# Patient Record
Sex: MALE | Race: WHITE | NOT HISPANIC OR LATINO | Employment: OTHER | URBAN - METROPOLITAN AREA
[De-identification: names, ages, dates, MRNs, and addresses within clinical notes are randomized per-mention and may not be internally consistent; named-entity substitution may affect disease eponyms.]

---

## 2017-01-06 ENCOUNTER — COMMUNICATION - HEALTHEAST (OUTPATIENT)
Dept: INTERNAL MEDICINE | Facility: CLINIC | Age: 67
End: 2017-01-06

## 2017-01-06 DIAGNOSIS — R52 PAIN: ICD-10-CM

## 2017-02-09 ENCOUNTER — COMMUNICATION - HEALTHEAST (OUTPATIENT)
Dept: INTERNAL MEDICINE | Facility: CLINIC | Age: 67
End: 2017-02-09

## 2017-02-09 DIAGNOSIS — R52 PAIN: ICD-10-CM

## 2017-03-12 ENCOUNTER — COMMUNICATION - HEALTHEAST (OUTPATIENT)
Dept: INTERNAL MEDICINE | Facility: CLINIC | Age: 67
End: 2017-03-12

## 2017-03-12 DIAGNOSIS — R52 PAIN: ICD-10-CM

## 2017-04-09 ENCOUNTER — COMMUNICATION - HEALTHEAST (OUTPATIENT)
Dept: INTERNAL MEDICINE | Facility: CLINIC | Age: 67
End: 2017-04-09

## 2017-04-14 ENCOUNTER — COMMUNICATION - HEALTHEAST (OUTPATIENT)
Dept: INTERNAL MEDICINE | Facility: CLINIC | Age: 67
End: 2017-04-14

## 2017-04-14 DIAGNOSIS — R52 PAIN: ICD-10-CM

## 2017-06-04 ENCOUNTER — COMMUNICATION - HEALTHEAST (OUTPATIENT)
Dept: INTERNAL MEDICINE | Facility: CLINIC | Age: 67
End: 2017-06-04

## 2017-06-05 ENCOUNTER — COMMUNICATION - HEALTHEAST (OUTPATIENT)
Dept: INTERNAL MEDICINE | Facility: CLINIC | Age: 67
End: 2017-06-05

## 2017-06-05 DIAGNOSIS — R52 PAIN: ICD-10-CM

## 2017-09-05 ENCOUNTER — COMMUNICATION - HEALTHEAST (OUTPATIENT)
Dept: INTERNAL MEDICINE | Facility: CLINIC | Age: 67
End: 2017-09-05

## 2017-09-28 ENCOUNTER — COMMUNICATION - HEALTHEAST (OUTPATIENT)
Dept: INTERNAL MEDICINE | Facility: CLINIC | Age: 67
End: 2017-09-28

## 2017-09-28 ENCOUNTER — OFFICE VISIT - HEALTHEAST (OUTPATIENT)
Dept: INTERNAL MEDICINE | Facility: CLINIC | Age: 67
End: 2017-09-28

## 2017-09-28 DIAGNOSIS — K13.0 LIP LESION: ICD-10-CM

## 2017-09-28 DIAGNOSIS — I10 ESSENTIAL HYPERTENSION WITH GOAL BLOOD PRESSURE LESS THAN 140/90: ICD-10-CM

## 2017-09-28 DIAGNOSIS — M54.50 LUMBAGO: ICD-10-CM

## 2017-09-28 DIAGNOSIS — Z23 NEED FOR VACCINATION: ICD-10-CM

## 2017-09-28 DIAGNOSIS — Z00.00 HEALTHCARE MAINTENANCE: ICD-10-CM

## 2017-09-28 DIAGNOSIS — M79.673 FOOT PAIN: ICD-10-CM

## 2017-09-28 DIAGNOSIS — K43.9 VENTRAL HERNIA: ICD-10-CM

## 2017-09-28 LAB
CHOLEST SERPL-MCNC: 185 MG/DL
FASTING STATUS PATIENT QL REPORTED: YES
HDLC SERPL-MCNC: 31 MG/DL
LDLC SERPL CALC-MCNC: 115 MG/DL
TRIGL SERPL-MCNC: 197 MG/DL

## 2017-09-28 ASSESSMENT — MIFFLIN-ST. JEOR: SCORE: 2041.79

## 2017-10-01 ENCOUNTER — COMMUNICATION - HEALTHEAST (OUTPATIENT)
Dept: INTERNAL MEDICINE | Facility: CLINIC | Age: 67
End: 2017-10-01

## 2017-10-01 DIAGNOSIS — I10 ESSENTIAL HYPERTENSION: ICD-10-CM

## 2017-10-02 ENCOUNTER — OFFICE VISIT - HEALTHEAST (OUTPATIENT)
Dept: SURGERY | Facility: CLINIC | Age: 67
End: 2017-10-02

## 2017-10-02 DIAGNOSIS — K43.6 INCARCERATED VENTRAL HERNIA: ICD-10-CM

## 2017-10-02 ASSESSMENT — MIFFLIN-ST. JEOR: SCORE: 2041.79

## 2017-10-03 ENCOUNTER — RECORDS - HEALTHEAST (OUTPATIENT)
Dept: ADMINISTRATIVE | Facility: OTHER | Age: 67
End: 2017-10-03

## 2017-10-30 ENCOUNTER — COMMUNICATION - HEALTHEAST (OUTPATIENT)
Dept: INTERNAL MEDICINE | Facility: CLINIC | Age: 67
End: 2017-10-30

## 2017-10-30 DIAGNOSIS — R52 PAIN: ICD-10-CM

## 2017-11-16 ENCOUNTER — OFFICE VISIT - HEALTHEAST (OUTPATIENT)
Dept: PODIATRY | Facility: CLINIC | Age: 67
End: 2017-11-16

## 2017-11-16 DIAGNOSIS — M21.6X2 PRONATION DEFORMITY OF BOTH FEET: ICD-10-CM

## 2017-11-16 DIAGNOSIS — M62.40 CONTRACTED, TENDON: ICD-10-CM

## 2017-11-16 DIAGNOSIS — M21.6X1 PRONATION DEFORMITY OF BOTH FEET: ICD-10-CM

## 2017-11-21 ENCOUNTER — COMMUNICATION - HEALTHEAST (OUTPATIENT)
Dept: OTHER | Facility: CLINIC | Age: 67
End: 2017-11-21

## 2017-12-01 ENCOUNTER — COMMUNICATION - HEALTHEAST (OUTPATIENT)
Dept: OTHER | Facility: CLINIC | Age: 67
End: 2017-12-01

## 2017-12-02 ENCOUNTER — COMMUNICATION - HEALTHEAST (OUTPATIENT)
Dept: INTERNAL MEDICINE | Facility: CLINIC | Age: 67
End: 2017-12-02

## 2017-12-02 DIAGNOSIS — R52 PAIN: ICD-10-CM

## 2017-12-30 ENCOUNTER — COMMUNICATION - HEALTHEAST (OUTPATIENT)
Dept: INTERNAL MEDICINE | Facility: CLINIC | Age: 67
End: 2017-12-30

## 2017-12-30 DIAGNOSIS — R52 PAIN: ICD-10-CM

## 2018-01-19 ENCOUNTER — RECORDS - HEALTHEAST (OUTPATIENT)
Dept: GENERAL RADIOLOGY | Facility: CLINIC | Age: 68
End: 2018-01-19

## 2018-01-19 ENCOUNTER — OFFICE VISIT - HEALTHEAST (OUTPATIENT)
Dept: INTERNAL MEDICINE | Facility: CLINIC | Age: 68
End: 2018-01-19

## 2018-01-19 DIAGNOSIS — M79.605 PAIN OF LEFT LOWER EXTREMITY: ICD-10-CM

## 2018-01-19 DIAGNOSIS — M79.89 LEG SWELLING: ICD-10-CM

## 2018-01-19 DIAGNOSIS — I10 ESSENTIAL HYPERTENSION: ICD-10-CM

## 2018-01-19 DIAGNOSIS — T14.8XXA BRUISE: ICD-10-CM

## 2018-01-19 DIAGNOSIS — M79.89 OTHER SPECIFIED SOFT TISSUE DISORDERS: ICD-10-CM

## 2018-01-19 DIAGNOSIS — M79.605 PAIN IN LEFT LEG: ICD-10-CM

## 2018-01-19 DIAGNOSIS — T14.8XXA OTHER INJURY OF UNSPECIFIED BODY REGION, INITIAL ENCOUNTER: ICD-10-CM

## 2018-01-19 ASSESSMENT — MIFFLIN-ST. JEOR: SCORE: 2050.86

## 2018-06-15 ENCOUNTER — COMMUNICATION - HEALTHEAST (OUTPATIENT)
Dept: INTERNAL MEDICINE | Facility: CLINIC | Age: 68
End: 2018-06-15

## 2018-06-15 DIAGNOSIS — R52 PAIN: ICD-10-CM

## 2018-08-18 ENCOUNTER — COMMUNICATION - HEALTHEAST (OUTPATIENT)
Dept: INTERNAL MEDICINE | Facility: CLINIC | Age: 68
End: 2018-08-18

## 2018-08-18 DIAGNOSIS — I10 ESSENTIAL HYPERTENSION: ICD-10-CM

## 2018-12-14 ENCOUNTER — COMMUNICATION - HEALTHEAST (OUTPATIENT)
Dept: INTERNAL MEDICINE | Facility: CLINIC | Age: 68
End: 2018-12-14

## 2018-12-14 DIAGNOSIS — I10 ESSENTIAL HYPERTENSION: ICD-10-CM

## 2019-03-11 ENCOUNTER — COMMUNICATION - HEALTHEAST (OUTPATIENT)
Dept: INTERNAL MEDICINE | Facility: CLINIC | Age: 69
End: 2019-03-11

## 2019-03-11 ENCOUNTER — OFFICE VISIT - HEALTHEAST (OUTPATIENT)
Dept: INTERNAL MEDICINE | Facility: CLINIC | Age: 69
End: 2019-03-11

## 2019-03-11 DIAGNOSIS — I10 ESSENTIAL HYPERTENSION: ICD-10-CM

## 2019-03-11 DIAGNOSIS — K43.9 VENTRAL HERNIA WITHOUT OBSTRUCTION OR GANGRENE: ICD-10-CM

## 2019-03-11 ASSESSMENT — MIFFLIN-ST. JEOR: SCORE: 1960.14

## 2019-03-13 ENCOUNTER — OFFICE VISIT - HEALTHEAST (OUTPATIENT)
Dept: SURGERY | Facility: CLINIC | Age: 69
End: 2019-03-13

## 2019-03-13 DIAGNOSIS — K43.9 VENTRAL HERNIA WITHOUT OBSTRUCTION OR GANGRENE: ICD-10-CM

## 2019-03-13 RX ORDER — NAPROXEN SODIUM 220 MG
220 TABLET ORAL EVERY MORNING
Status: SHIPPED | COMMUNITY
Start: 2019-03-13 | End: 2022-09-08

## 2019-03-13 ASSESSMENT — MIFFLIN-ST. JEOR: SCORE: 1946.53

## 2019-04-10 ENCOUNTER — OFFICE VISIT - HEALTHEAST (OUTPATIENT)
Dept: INTERNAL MEDICINE | Facility: CLINIC | Age: 69
End: 2019-04-10

## 2019-04-10 DIAGNOSIS — Z01.818 PRE-OP EXAM: ICD-10-CM

## 2019-04-10 LAB
ATRIAL RATE - MUSE: 81 BPM
DIASTOLIC BLOOD PRESSURE - MUSE: NORMAL MMHG
HGB BLD-MCNC: 15.1 G/DL (ref 14–18)
INTERPRETATION ECG - MUSE: NORMAL
P AXIS - MUSE: 62 DEGREES
POTASSIUM BLD-SCNC: 4.1 MMOL/L (ref 3.5–5)
PR INTERVAL - MUSE: 150 MS
QRS DURATION - MUSE: 98 MS
QT - MUSE: 352 MS
QTC - MUSE: 408 MS
R AXIS - MUSE: 43 DEGREES
SYSTOLIC BLOOD PRESSURE - MUSE: NORMAL MMHG
T AXIS - MUSE: 43 DEGREES
VENTRICULAR RATE- MUSE: 81 BPM

## 2019-04-10 ASSESSMENT — MIFFLIN-ST. JEOR: SCORE: 1937.46

## 2019-04-16 ENCOUNTER — COMMUNICATION - HEALTHEAST (OUTPATIENT)
Dept: INTERNAL MEDICINE | Facility: CLINIC | Age: 69
End: 2019-04-16

## 2019-04-17 ENCOUNTER — COMMUNICATION - HEALTHEAST (OUTPATIENT)
Dept: INTERNAL MEDICINE | Facility: CLINIC | Age: 69
End: 2019-04-17

## 2019-04-17 DIAGNOSIS — J06.9 UPPER RESPIRATORY TRACT INFECTION, UNSPECIFIED TYPE: ICD-10-CM

## 2019-04-22 ENCOUNTER — AMBULATORY - HEALTHEAST (OUTPATIENT)
Dept: INTERNAL MEDICINE | Facility: CLINIC | Age: 69
End: 2019-04-22

## 2019-04-22 ENCOUNTER — COMMUNICATION - HEALTHEAST (OUTPATIENT)
Dept: INTERNAL MEDICINE | Facility: CLINIC | Age: 69
End: 2019-04-22

## 2019-04-22 DIAGNOSIS — R05.9 COUGH: ICD-10-CM

## 2019-04-22 ASSESSMENT — MIFFLIN-ST. JEOR: SCORE: 1937.46

## 2019-04-23 ENCOUNTER — RECORDS - HEALTHEAST (OUTPATIENT)
Dept: GENERAL RADIOLOGY | Facility: CLINIC | Age: 69
End: 2019-04-23

## 2019-04-23 DIAGNOSIS — R05.9 COUGH: ICD-10-CM

## 2019-04-24 ENCOUNTER — ANESTHESIA - HEALTHEAST (OUTPATIENT)
Dept: SURGERY | Facility: HOSPITAL | Age: 69
End: 2019-04-24

## 2019-04-25 ENCOUNTER — SURGERY - HEALTHEAST (OUTPATIENT)
Dept: SURGERY | Facility: HOSPITAL | Age: 69
End: 2019-04-25

## 2019-05-09 ENCOUNTER — OFFICE VISIT - HEALTHEAST (OUTPATIENT)
Dept: SURGERY | Facility: CLINIC | Age: 69
End: 2019-05-09

## 2019-05-09 DIAGNOSIS — Z48.89 POSTOPERATIVE VISIT: ICD-10-CM

## 2019-05-09 DIAGNOSIS — L76.34 POSTOPERATIVE SEROMA OF SUBCUTANEOUS TISSUE AFTER NON-DERMATOLOGIC PROCEDURE: ICD-10-CM

## 2019-05-28 ENCOUNTER — COMMUNICATION - HEALTHEAST (OUTPATIENT)
Dept: INTERNAL MEDICINE | Facility: CLINIC | Age: 69
End: 2019-05-28

## 2019-05-28 DIAGNOSIS — I10 ESSENTIAL HYPERTENSION: ICD-10-CM

## 2019-08-22 ENCOUNTER — COMMUNICATION - HEALTHEAST (OUTPATIENT)
Dept: INTERNAL MEDICINE | Facility: CLINIC | Age: 69
End: 2019-08-22

## 2019-08-22 DIAGNOSIS — I10 ESSENTIAL HYPERTENSION: ICD-10-CM

## 2019-11-15 ENCOUNTER — AMBULATORY - HEALTHEAST (OUTPATIENT)
Dept: NURSING | Facility: CLINIC | Age: 69
End: 2019-11-15

## 2019-11-15 DIAGNOSIS — Z23 FLU VACCINE NEED: ICD-10-CM

## 2020-02-14 ENCOUNTER — COMMUNICATION - HEALTHEAST (OUTPATIENT)
Dept: INTERNAL MEDICINE | Facility: CLINIC | Age: 70
End: 2020-02-14

## 2020-02-14 DIAGNOSIS — I10 ESSENTIAL HYPERTENSION: ICD-10-CM

## 2020-03-26 ENCOUNTER — COMMUNICATION - HEALTHEAST (OUTPATIENT)
Dept: INTERNAL MEDICINE | Facility: CLINIC | Age: 70
End: 2020-03-26

## 2020-03-26 DIAGNOSIS — I10 ESSENTIAL HYPERTENSION: ICD-10-CM

## 2020-06-26 ENCOUNTER — COMMUNICATION - HEALTHEAST (OUTPATIENT)
Dept: INTERNAL MEDICINE | Facility: CLINIC | Age: 70
End: 2020-06-26

## 2020-06-26 DIAGNOSIS — I10 ESSENTIAL HYPERTENSION: ICD-10-CM

## 2020-09-03 ENCOUNTER — OFFICE VISIT - HEALTHEAST (OUTPATIENT)
Dept: PODIATRY | Facility: CLINIC | Age: 70
End: 2020-09-03

## 2020-09-03 DIAGNOSIS — L30.9 DERMATITIS OF RIGHT FOOT: ICD-10-CM

## 2020-09-03 DIAGNOSIS — M21.6X1 PRONATION DEFORMITY OF BOTH FEET: ICD-10-CM

## 2020-09-03 DIAGNOSIS — M62.40 CONTRACTED, TENDON: ICD-10-CM

## 2020-09-03 DIAGNOSIS — M21.6X2 PRONATION DEFORMITY OF BOTH FEET: ICD-10-CM

## 2020-09-03 ASSESSMENT — MIFFLIN-ST. JEOR: SCORE: 1903.44

## 2020-09-25 ENCOUNTER — COMMUNICATION - HEALTHEAST (OUTPATIENT)
Dept: INTERNAL MEDICINE | Facility: CLINIC | Age: 70
End: 2020-09-25

## 2020-09-25 DIAGNOSIS — I10 ESSENTIAL HYPERTENSION: ICD-10-CM

## 2020-11-15 ENCOUNTER — COMMUNICATION - HEALTHEAST (OUTPATIENT)
Dept: INTERNAL MEDICINE | Facility: CLINIC | Age: 70
End: 2020-11-15

## 2020-11-15 DIAGNOSIS — I10 ESSENTIAL HYPERTENSION: ICD-10-CM

## 2020-12-15 ENCOUNTER — COMMUNICATION - HEALTHEAST (OUTPATIENT)
Dept: INTERNAL MEDICINE | Facility: CLINIC | Age: 70
End: 2020-12-15

## 2020-12-15 DIAGNOSIS — I10 ESSENTIAL HYPERTENSION: ICD-10-CM

## 2021-01-13 ENCOUNTER — COMMUNICATION - HEALTHEAST (OUTPATIENT)
Dept: INTERNAL MEDICINE | Facility: CLINIC | Age: 71
End: 2021-01-13

## 2021-01-13 DIAGNOSIS — I10 ESSENTIAL HYPERTENSION: ICD-10-CM

## 2021-01-22 ENCOUNTER — COMMUNICATION - HEALTHEAST (OUTPATIENT)
Dept: INTERNAL MEDICINE | Facility: CLINIC | Age: 71
End: 2021-01-22

## 2021-01-22 DIAGNOSIS — I10 ESSENTIAL HYPERTENSION: ICD-10-CM

## 2021-01-22 DIAGNOSIS — Z12.11 ENCOUNTER FOR SCREENING FOR MALIGNANT NEOPLASM OF COLON: ICD-10-CM

## 2021-02-10 ENCOUNTER — COMMUNICATION - HEALTHEAST (OUTPATIENT)
Dept: INTERNAL MEDICINE | Facility: CLINIC | Age: 71
End: 2021-02-10

## 2021-02-10 DIAGNOSIS — I10 ESSENTIAL HYPERTENSION: ICD-10-CM

## 2021-02-11 ENCOUNTER — COMMUNICATION - HEALTHEAST (OUTPATIENT)
Dept: INTERNAL MEDICINE | Facility: CLINIC | Age: 71
End: 2021-02-11

## 2021-02-11 DIAGNOSIS — I10 ESSENTIAL HYPERTENSION: ICD-10-CM

## 2021-03-09 ENCOUNTER — COMMUNICATION - HEALTHEAST (OUTPATIENT)
Dept: INTERNAL MEDICINE | Facility: CLINIC | Age: 71
End: 2021-03-09

## 2021-03-09 DIAGNOSIS — I10 ESSENTIAL HYPERTENSION: ICD-10-CM

## 2021-03-12 ENCOUNTER — COMMUNICATION - HEALTHEAST (OUTPATIENT)
Dept: ADMINISTRATIVE | Facility: CLINIC | Age: 71
End: 2021-03-12

## 2021-03-12 DIAGNOSIS — I10 ESSENTIAL HYPERTENSION: ICD-10-CM

## 2021-03-17 ENCOUNTER — COMMUNICATION - HEALTHEAST (OUTPATIENT)
Dept: INTERNAL MEDICINE | Facility: CLINIC | Age: 71
End: 2021-03-17

## 2021-03-17 ENCOUNTER — OFFICE VISIT - HEALTHEAST (OUTPATIENT)
Dept: INTERNAL MEDICINE | Facility: CLINIC | Age: 71
End: 2021-03-17

## 2021-03-17 DIAGNOSIS — M48.062 SPINAL STENOSIS OF LUMBAR REGION WITH NEUROGENIC CLAUDICATION: ICD-10-CM

## 2021-03-17 DIAGNOSIS — Z12.11 SCREEN FOR COLON CANCER: ICD-10-CM

## 2021-03-17 DIAGNOSIS — E66.01 MORBID OBESITY (H): ICD-10-CM

## 2021-03-17 DIAGNOSIS — Z12.5 SCREENING FOR PROSTATE CANCER: ICD-10-CM

## 2021-03-17 DIAGNOSIS — Z13.220 SCREENING FOR HYPERLIPIDEMIA: ICD-10-CM

## 2021-03-17 DIAGNOSIS — M47.27 LUMBOSACRAL SPONDYLOSIS WITH RADICULOPATHY: ICD-10-CM

## 2021-03-17 DIAGNOSIS — R06.83 SNORING: ICD-10-CM

## 2021-03-17 DIAGNOSIS — I10 ESSENTIAL HYPERTENSION: ICD-10-CM

## 2021-03-17 LAB
ALBUMIN SERPL-MCNC: 3.8 G/DL (ref 3.5–5)
ALP SERPL-CCNC: 59 U/L (ref 45–120)
ALT SERPL W P-5'-P-CCNC: 17 U/L (ref 0–45)
ANION GAP SERPL CALCULATED.3IONS-SCNC: 9 MMOL/L (ref 5–18)
AST SERPL W P-5'-P-CCNC: 18 U/L (ref 0–40)
BASOPHILS # BLD AUTO: 0 THOU/UL (ref 0–0.2)
BASOPHILS NFR BLD AUTO: 0 % (ref 0–2)
BILIRUB SERPL-MCNC: 0.6 MG/DL (ref 0–1)
BUN SERPL-MCNC: 17 MG/DL (ref 8–22)
CALCIUM SERPL-MCNC: 9 MG/DL (ref 8.5–10.5)
CHLORIDE BLD-SCNC: 105 MMOL/L (ref 98–107)
CHOLEST SERPL-MCNC: 201 MG/DL
CO2 SERPL-SCNC: 27 MMOL/L (ref 22–31)
CREAT SERPL-MCNC: 0.82 MG/DL (ref 0.7–1.3)
EOSINOPHIL # BLD AUTO: 0.3 THOU/UL (ref 0–0.4)
EOSINOPHIL NFR BLD AUTO: 3 % (ref 0–6)
ERYTHROCYTE [DISTWIDTH] IN BLOOD BY AUTOMATED COUNT: 14.3 % (ref 11–14.5)
FASTING STATUS PATIENT QL REPORTED: YES
GFR SERPL CREATININE-BSD FRML MDRD: >60 ML/MIN/1.73M2
GLUCOSE BLD-MCNC: 105 MG/DL (ref 70–125)
HCT VFR BLD AUTO: 48.2 % (ref 40–54)
HDLC SERPL-MCNC: 37 MG/DL
HGB BLD-MCNC: 15.5 G/DL (ref 14–18)
IMM GRANULOCYTES # BLD: 0 THOU/UL
IMM GRANULOCYTES NFR BLD: 0 %
LDLC SERPL CALC-MCNC: 128 MG/DL
LYMPHOCYTES # BLD AUTO: 2.3 THOU/UL (ref 0.8–4.4)
LYMPHOCYTES NFR BLD AUTO: 25 % (ref 20–40)
MCH RBC QN AUTO: 27.9 PG (ref 27–34)
MCHC RBC AUTO-ENTMCNC: 32.2 G/DL (ref 32–36)
MCV RBC AUTO: 87 FL (ref 80–100)
MONOCYTES # BLD AUTO: 0.7 THOU/UL (ref 0–0.9)
MONOCYTES NFR BLD AUTO: 8 % (ref 2–10)
NEUTROPHILS # BLD AUTO: 5.8 THOU/UL (ref 2–7.7)
NEUTROPHILS NFR BLD AUTO: 64 % (ref 50–70)
PLATELET # BLD AUTO: 255 THOU/UL (ref 140–440)
PMV BLD AUTO: 9.4 FL (ref 7–10)
POTASSIUM BLD-SCNC: 5 MMOL/L (ref 3.5–5)
PROT SERPL-MCNC: 6.7 G/DL (ref 6–8)
PSA SERPL-MCNC: 0.5 NG/ML (ref 0–4.5)
RBC # BLD AUTO: 5.55 MILL/UL (ref 4.4–6.2)
SODIUM SERPL-SCNC: 141 MMOL/L (ref 136–145)
TRIGL SERPL-MCNC: 179 MG/DL
WBC: 9.1 THOU/UL (ref 4–11)

## 2021-03-17 RX ORDER — LISINOPRIL 20 MG/1
20 TABLET ORAL DAILY
Qty: 90 TABLET | Refills: 3 | Status: SHIPPED | OUTPATIENT
Start: 2021-03-17 | End: 2021-07-15

## 2021-03-17 RX ORDER — AMLODIPINE BESYLATE 10 MG/1
TABLET ORAL
Qty: 90 TABLET | Refills: 3 | Status: SHIPPED | OUTPATIENT
Start: 2021-03-17 | End: 2021-07-14

## 2021-03-17 ASSESSMENT — MIFFLIN-ST. JEOR: SCORE: 1964.96

## 2021-03-27 ENCOUNTER — COMMUNICATION - HEALTHEAST (OUTPATIENT)
Dept: INTERNAL MEDICINE | Facility: CLINIC | Age: 71
End: 2021-03-27

## 2021-04-20 ENCOUNTER — RECORDS - HEALTHEAST (OUTPATIENT)
Dept: ADMINISTRATIVE | Facility: OTHER | Age: 71
End: 2021-04-20

## 2021-05-27 NOTE — PROGRESS NOTES
[facility] Follow Up Note    Jan Campa   68 y.o. male    Date of Visit: 4/10/2019    Chief Complaint   Patient presents with     Pre-op Exam     4/25 Dr Azevedo, [place], hernia     Subjective  This is a 68-year-old man who comes in today for preoperative evaluation.  Will be having a ventral hernia repair done at [facility] on April 25 by Dr. Jan Azevedo.  He has had a ventral hernia for some time but over the past year or so the symptoms have grown a little worse with increasing discomfort and some increasing in the bulging.  I saw him last about a month ago and we discussed referral to surgery for recommendation.  He was seen in the recommendation was made to go ahead and do the surgical repair.  The patient is otherwise feeling good and has had no other changes since I last saw him.  Medications are as listed.    Past medical history: The patient has had no prior surgery.  Medical issues include degenerative arthritis and hypertension.  No known drug allergies.    Social history: The patient is not a smoker.    Family history: This is noncontributory to the current issue.        ROS A comprehensive review of systems was performed and was otherwise negative    Medications, allergies, and problem list were reviewed and updated    Exam  General Appearance:   On examination his blood pressure is 126/72.  Weight is 256 pounds and height is 70 inches.  BMI is 36.73.    Eyes: Pupils are equal and conjunctiva are normal.    Ears nose and throat: Normal.    Neck: Supple with no masses and no neck vein distention.  No thyroid enlargement.    Lungs: Clear.    Cardiovascular: Heart is in a sinus rhythm with a rate of 92 and no ectopy.  No gallops or murmurs.  Carotid pulses are full with no bruits.  No peripheral edema.    GI: Abdomen is soft and nontender with no distention.  No masses or organomegaly.  He does have a moderate sized ventral hernia.    Musculoskeletal: Head and neck  are normal to inspection with good range of motion.  Good strength and range of motion in all 4 extremities.    Neurologic: Cranial nerves are intact.  Gait is normal.    Psychiatric: The patient is alert and oriented x3.      Assessment/Plan  1. Pre-op exam  Electrocardiogram Perform and Read    Hemoglobin    Potassium     I do not see any contraindication to the proposed procedure.    No prior surgeries so we have no history of bleeding or anesthesia exposure.    An EKG appears unremarkable.  We will also check a hemoglobin and potassium.  No other specific recommendations.    No history of sleep apnea.    He will hold his Aleve for 4 days prior to surgery and take his usual blood pressure medications the day prior to surgery.    I will follow-up with him as needed postoperatively.  Body Mass Index was not assessed due to Patient was in for preoperative evaluation..    Duke Olivas MD      Current Outpatient Medications on File Prior to Visit   Medication Sig     amLODIPine (NORVASC) 10 MG tablet Take 1 tablet (10 mg total) by mouth daily.     lisinopril (PRINIVIL,ZESTRIL) 20 MG tablet Take 1 tablet (20 mg total) by mouth daily.     naproxen sodium (ALEVE) 220 MG tablet Take 220 mg by mouth 2 (two) times a day with meals.     UNABLE TO FIND [employer] NaturalsMed Name:     traMADol (ULTRAM) 50 mg tablet TAKE 1 TABLET BY MOUTH THREE TIMES DAILY AS NEEDED     No current facility-administered medications on file prior to visit.      No Known Allergies  Social History     Tobacco Use     Smoking status: Never Smoker     Smokeless tobacco: Never Used   Substance Use Topics     Alcohol use: Yes     Comment: 4 cans of beer/week     Drug use: No

## 2021-05-27 NOTE — TELEPHONE ENCOUNTER
Question following Office Visit  When did you see your provider: 4/10/2019  What is your question: patient states he was to call provider if his cough did not go away.  Patient states he still has his cough.  Okay to leave a detailed message: Yes

## 2021-05-27 NOTE — TELEPHONE ENCOUNTER
Spoke with the patient and relayed message below from Dr. Olivas.  Patient verbalized understanding and had no further questions at this time.    Prescription has been set up for Dr. Olivas to review.  Anuradha GARCIA CMA/RANULFO....................3:39 PM

## 2021-05-28 NOTE — TELEPHONE ENCOUNTER
Spoke with patient and he will come in sometime in the morning. Spoke with Dilia and she said the  will make his appointment when he gets here. Please place appropriate order/diagnosis. Thank you.    Madhavi Damon, CMA

## 2021-05-28 NOTE — TELEPHONE ENCOUNTER
Describe your symptoms:  Cough  Any pain: no  New/Ongoing: Ongoing  How long have you been having symptoms: 2  week(s)  Have you been seen for this:  No.  Appointment offered? No as patient has already been seen  Triage offered?: No as patient was already seen  Home remedies tried: Finished his last Z pack today  Pharmacy Name and Location:  [facility]  Okay to leave a detailed message? Yes     Patient is to have surgery later in the week and was told to contact his primary regarding cough.  Urgent

## 2021-05-28 NOTE — PROGRESS NOTES
HPI: Pt is here for follow up robotic ventral hernia repair with Dr. Hua on 4/18/2019.   he is doing well.  Pain is well controlled.  No difficulties with the surgical wound/wounds.  he is eating well and denies fever and chills.  He reports he has noticed over the past week a bulge occurring at the site of the hernia.  There is no pain, but he is concerned about recurrence of the hernia.      /62 (Patient Site: Right Arm, Patient Position: Sitting, Cuff Size: Adult Large)   Pulse 80   Temp 97.3  F (36.3  C) (Tympanic)   Wt (!) 251 lb 12.8 oz (114.2 kg)   SpO2 98%   BMI 36.13 kg/m      EXAM:  GENERAL:Appears well  ABDOMEN:  Soft, +BS; bulge proximal to umbilicus that is fluctuant without erythema, induration, or tenderness  SURGICAL WOUNDS:  Incisions healing well, no enduration or drainage.      Assessment/Plan: Seroma status post robotic ventral hernia repair.  The seroma was aspirated with an 18-gauge needle with a return of 114 mL serosanguineous fluid.  Patient reported relief and was advised to return to the clinic if the seroma recurs.  Follow-up as needed    Carolyn Rod , [facility]

## 2021-05-28 NOTE — ANESTHESIA CARE TRANSFER NOTE
Last vitals:   Vitals:    04/25/19 0958   BP: 140/73   Pulse: 89   Resp: 20   Temp: 37.6  C (99.6  F)   SpO2: 99%     Patient's level of consciousness is drowsy  Spontaneous respirations: yes  Maintains airway independently: yes  Dentition unchanged: yes  Oropharynx: oropharynx clear of all foreign objects    QCDR Measures:  ASA# 20 - Surgical Safety Checklist: WHO surgical safety checklist completed prior to induction    PQRS# 430 - Adult PONV Prevention: 4558F - Pt received => 2 anti-emetic agents (different classes) preop & intraop  ASA# 8 - Peds PONV Prevention: NA - Not pediatric patient, not GA or 2 or more risk factors NOT present  PQRS# 424 - Cally-op Temp Management: 4559F - At least one body temp DOCUMENTED => 35.5C or 95.9F within required timeframe  PQRS# 426 - PACU Transfer Protocol: - Transfer of care checklist used  ASA# 14 - Acute Post-op Pain: ASA14B - Patient did NOT experience pain >= 7 out of 10

## 2021-05-28 NOTE — ANESTHESIA POSTPROCEDURE EVALUATION
Patient: Jan Campa  ROBOTIC XI ASSISTED LAPAROSCOPIC VENTRAL HERNIA REPAIR WITH MESH  Anesthesia type: general    Patient location: PACU  Last vitals:   Vitals Value Taken Time   /88 4/25/2019 12:00 PM   Temp 37.3  C (99.1  F) 4/25/2019 11:15 AM   Pulse 85 4/25/2019 12:02 PM   Resp 16 4/25/2019 12:00 PM   SpO2 93 % 4/25/2019 12:02 PM   Vitals shown include unvalidated device data.  Post vital signs: stable  Level of consciousness: awake and responds to simple questions  Post-anesthesia pain: pain controlled  Post-anesthesia nausea and vomiting: no  Pulmonary: unassisted, return to baseline  Cardiovascular: stable and blood pressure at baseline  Hydration: adequate  Anesthetic events: no    QCDR Measures:  ASA# 11 - Cally-op Cardiac Arrest: ASA11B - Patient did NOT experience unanticipated cardiac arrest  ASA# 12 - Cally-op Mortality Rate: ASA12B - Patient did NOT die  ASA# 13 - PACU Re-Intubation Rate: ASA13B - Patient did NOT require a new airway mgmt  ASA# 10 - Composite Anes Safety: ASA10A - No serious adverse event    Additional Notes:

## 2021-05-28 NOTE — TELEPHONE ENCOUNTER
Upcoming surgery, still has cough. Please advise on next steps. Thank you.    Madhavi Damon, CMA

## 2021-05-28 NOTE — ANESTHESIA PREPROCEDURE EVALUATION
"Anesthesia Evaluation      Patient summary reviewed   No history of anesthetic complications (Mother had severe lung disease had to \"be careful\" giving her anesthesia. ALso, she had multiple allergies ti many agents. NO MH symptoms, tho)     Airway   Mallampati: II   Pulmonary - normal exam     ROS comment: Light cough  No symptoms current                         Cardiovascular - normal exam  Exercise tolerance: > or = 4 METS  (+) hypertension, ,     ECG reviewed (NSR inc RBBB)  Rhythm: regular  Rate: normal,         Neuro/Psych - negative ROS     Endo/Other - negative ROS   (+) obesity,      GI/Hepatic/Renal - negative ROS      Other findings: Results for NISREEN TANNER (MRN [id number]) as of 4/24/2019 20:22    4/10/2019 13:44  Potassium: 4.1  Hemoglobin: 15.1        Dental - normal exam                        Anesthesia Plan  Planned anesthetic: general endotracheal  GAETT  Antiemetics  Soft bite block  toradol ay end of case if okay with surgeon  ? TAP block for POP if requested  ASA 2   Induction: intravenous   Anesthetic plan and risks discussed with: patient and spouse    Post-op plan: routine recovery          "

## 2021-05-29 NOTE — TELEPHONE ENCOUNTER
Refill Approved    Rx renewed per Medication Renewal Policy. Medication was last renewed on 3/11/19.    Angela Olmstead, [facility] Triage/Med Refill 5/29/2019     Requested Prescriptions   Pending Prescriptions Disp Refills     amLODIPine (NORVASC) 10 MG tablet [Pharmacy Med Name: AMLODIPINE BESYLATE 10 MG TAB] 90 tablet 0     Sig: TAKE 1 TABLET BY MOUTH EVERY DAY       Calcium-Channel Blockers Protocol Passed - 5/28/2019  2:50 PM        Passed - PCP or prescribing provider visit in past 12 months or next 3 months     Last office visit with prescriber/PCP: 3/11/2019 uDke Olivas MD OR same dept: 3/11/2019 Duke Olivas MD OR same specialty: 3/11/2019 Duke Olivas MD  Last physical: 4/10/2019 Last MTM visit: Visit date not found   Next visit within 3 mo: Visit date not found  Next physical within 3 mo: Visit date not found  Prescriber OR PCP: Duke Olivas MD  Last diagnosis associated with med order: 1. Essential hypertension  - amLODIPine (NORVASC) 10 MG tablet [Pharmacy Med Name: AMLODIPINE BESYLATE 10 MG TAB]; TAKE 1 TABLET BY MOUTH EVERY DAY  Dispense: 90 tablet; Refill: 0    If protocol passes may refill for 12 months if within 3 months of last provider visit (or a total of 15 months).             Passed - Blood pressure filed in past 12 months     BP Readings from Last 1 Encounters:   05/09/19 118/62

## 2021-05-30 ENCOUNTER — RECORDS - HEALTHEAST (OUTPATIENT)
Dept: ADMINISTRATIVE | Facility: CLINIC | Age: 71
End: 2021-05-30

## 2021-05-31 ENCOUNTER — RECORDS - HEALTHEAST (OUTPATIENT)
Dept: ADMINISTRATIVE | Facility: CLINIC | Age: 71
End: 2021-05-31

## 2021-05-31 VITALS — HEIGHT: 70 IN | WEIGHT: 281 LBS | BODY MASS INDEX: 40.23 KG/M2

## 2021-05-31 VITALS — WEIGHT: 279 LBS | BODY MASS INDEX: 39.94 KG/M2 | HEIGHT: 70 IN

## 2021-05-31 VITALS — HEIGHT: 70 IN | WEIGHT: 279 LBS | BODY MASS INDEX: 39.94 KG/M2

## 2021-05-31 NOTE — TELEPHONE ENCOUNTER
RN cannot approve Refill Request    RN can NOT refill this medication PCP messaged that patient is overdue for Labs. Last office visit: 3/11/2019 Duke Olivas MD Last Physical: 4/10/2019 Last MTM visit: Visit date not found Last visit same specialty: 3/11/2019 Duke Olivas MD.  Next visit within 3 mo: Visit date not found  Next physical within 3 mo: Visit date not found      Dominga Hankins, [facility] Triage/Med Refill 8/22/2019    Requested Prescriptions   Pending Prescriptions Disp Refills     lisinopril (PRINIVIL,ZESTRIL) 20 MG tablet [Pharmacy Med Name: LISINOPRIL 20 MG TABLET] 90 tablet 1     Sig: TAKE 1 TABLET BY MOUTH EVERY DAY       Ace Inhibitors Refill Protocol Failed - 8/22/2019  9:07 AM        Failed - Serum Creatinine in past 12 months     Creatinine   Date Value Ref Range Status   09/28/2017 0.84 0.70 - 1.30 mg/dL Final             Passed - PCP or prescribing provider visit in past 12 months       Last office visit with prescriber/PCP: 3/11/2019 Duke Olivas MD OR same dept: 3/11/2019 Duke Olivas MD OR same specialty: 3/11/2019 Duke Olivas MD  Last physical: 4/10/2019 Last MTM visit: Visit date not found   Next visit within 3 mo: Visit date not found  Next physical within 3 mo: Visit date not found  Prescriber OR PCP: Duke Olivas MD  Last diagnosis associated with med order: 1. Essential hypertension  - lisinopril (PRINIVIL,ZESTRIL) 20 MG tablet [Pharmacy Med Name: LISINOPRIL 20 MG TABLET]; TAKE 1 TABLET BY MOUTH EVERY DAY  Dispense: 90 tablet; Refill: 1    If protocol passes may refill for 12 months if within 3 months of last provider visit (or a total of 15 months).             Passed - Serum Potassium in past 12 months     Lab Results   Component Value Date    Potassium 4.1 04/10/2019             Passed - Blood pressure filed in past 12 months     BP Readings from Last 1 Encounters:   05/09/19 118/62

## 2021-06-02 VITALS — HEIGHT: 70 IN | WEIGHT: 258 LBS | BODY MASS INDEX: 36.94 KG/M2

## 2021-06-02 VITALS — BODY MASS INDEX: 36.65 KG/M2 | HEIGHT: 70 IN | WEIGHT: 256 LBS

## 2021-06-02 VITALS — WEIGHT: 251.8 LBS | BODY MASS INDEX: 36.13 KG/M2

## 2021-06-02 VITALS — HEIGHT: 70 IN | WEIGHT: 261 LBS | BODY MASS INDEX: 37.37 KG/M2

## 2021-06-04 VITALS
HEART RATE: 84 BPM | HEIGHT: 71 IN | DIASTOLIC BLOOD PRESSURE: 80 MMHG | TEMPERATURE: 98.8 F | SYSTOLIC BLOOD PRESSURE: 120 MMHG | OXYGEN SATURATION: 97 % | BODY MASS INDEX: 34.3 KG/M2 | WEIGHT: 245 LBS

## 2021-06-05 ENCOUNTER — RECORDS - HEALTHEAST (OUTPATIENT)
Dept: PHYSICAL MEDICINE AND REHAB | Facility: CLINIC | Age: 71
End: 2021-06-05

## 2021-06-05 ENCOUNTER — HEALTH MAINTENANCE LETTER (OUTPATIENT)
Age: 71
End: 2021-06-05

## 2021-06-05 VITALS
OXYGEN SATURATION: 98 % | RESPIRATION RATE: 18 BRPM | HEART RATE: 78 BPM | HEIGHT: 71 IN | WEIGHT: 258.56 LBS | DIASTOLIC BLOOD PRESSURE: 84 MMHG | SYSTOLIC BLOOD PRESSURE: 136 MMHG | BODY MASS INDEX: 36.2 KG/M2

## 2021-06-07 NOTE — TELEPHONE ENCOUNTER
Patient states in the beginning of this year his insurance switched pharmacy to [facility] he did not refill any of his medication at [facility] # [id number] this calender year . Patient is using [facility] pharmacy  # [id number] requesting to send Rx  To [facility].  Refill Request  Did you contact pharmacy: No  Medication name:   Requested Prescriptions     Pending Prescriptions Disp Refills     amLODIPine (NORVASC) 10 MG tablet 90 tablet 3     Sig: Take 1 tablet (10 mg total) by mouth daily.     lisinopriL (PRINIVIL,ZESTRIL) 20 MG tablet 90 tablet 1     Sig: Take 1 tablet (20 mg total) by mouth daily.   Who prescribed the medication: Duke Olivas MD  Requested Pharmacy: [facility]  Is patient out of medication: No.  3 days left  Patient notified refills processed in 3 business days:  yes  Okay to leave a detailed message: no

## 2021-06-07 NOTE — TELEPHONE ENCOUNTER
Refill Approved    Rx renewed per Medication Renewal Policy. Medication was last renewed on 5/29/19.    Angela Olmstead, [facility] Triage/Med Refill 3/27/2020     Requested Prescriptions   Pending Prescriptions Disp Refills     amLODIPine (NORVASC) 10 MG tablet 90 tablet 3     Sig: Take 1 tablet (10 mg total) by mouth daily.       Calcium-Channel Blockers Protocol Passed - 3/26/2020  4:13 PM        Passed - PCP or prescribing provider visit in past 12 months or next 3 months     Last office visit with prescriber/PCP: 3/11/2019 Duke Olivas MD OR same dept: Visit date not found OR same specialty: 3/11/2019 Duke Olivas MD  Last physical: 4/10/2019 Last MTM visit: Visit date not found   Next visit within 3 mo: Visit date not found  Next physical within 3 mo: Visit date not found  Prescriber OR PCP: Duke Olivas MD  Last diagnosis associated with med order: 1. Essential hypertension  - amLODIPine (NORVASC) 10 MG tablet; Take 1 tablet (10 mg total) by mouth daily.  Dispense: 90 tablet; Refill: 3  - lisinopriL (PRINIVIL,ZESTRIL) 20 MG tablet; Take 1 tablet (20 mg total) by mouth daily.  Dispense: 90 tablet; Refill: 1    If protocol passes may refill for 12 months if within 3 months of last provider visit (or a total of 15 months).             Passed - Blood pressure filed in past 12 months     BP Readings from Last 1 Encounters:   05/09/19 118/62                lisinopriL (PRINIVIL,ZESTRIL) 20 MG tablet 90 tablet 1     Sig: Take 1 tablet (20 mg total) by mouth daily.       Ace Inhibitors Refill Protocol Failed - 3/26/2020  4:13 PM        Failed - Serum Creatinine in past 12 months     Creatinine   Date Value Ref Range Status   09/28/2017 0.84 0.70 - 1.30 mg/dL Final             Passed - PCP or prescribing provider visit in past 12 months       Last office visit with prescriber/PCP: 3/11/2019 Duke Olivas MD OR same dept: Visit date not found OR same specialty: 3/11/2019 Duke Olivas MD  Last  physical: 4/10/2019 Last MTM visit: Visit date not found   Next visit within 3 mo: Visit date not found  Next physical within 3 mo: Visit date not found  Prescriber OR PCP: Duke Olivas MD  Last diagnosis associated with med order: 1. Essential hypertension  - amLODIPine (NORVASC) 10 MG tablet; Take 1 tablet (10 mg total) by mouth daily.  Dispense: 90 tablet; Refill: 3  - lisinopriL (PRINIVIL,ZESTRIL) 20 MG tablet; Take 1 tablet (20 mg total) by mouth daily.  Dispense: 90 tablet; Refill: 1    If protocol passes may refill for 12 months if within 3 months of last provider visit (or a total of 15 months).             Passed - Serum Potassium in past 12 months     Lab Results   Component Value Date    Potassium 4.1 04/10/2019             Passed - Blood pressure filed in past 12 months     BP Readings from Last 1 Encounters:   05/09/19 118/62

## 2021-06-09 NOTE — TELEPHONE ENCOUNTER
RN cannot approve Refill Request    RN can NOT refill this medication PCP messaged that patient is overdue for Labs and Office Visit. Last office visit: 3/11/2019 Duke Olivas MD Last Physical: 4/10/2019 Last MTM visit: Visit date not found Last visit same specialty: 3/11/2019 Duke Olivas MD.  Next visit within 3 mo: Visit date not found  Next physical within 3 mo: Visit date not found      Trisha Patino, [facility] Triage/Med Refill 6/27/2020    Requested Prescriptions   Pending Prescriptions Disp Refills     amLODIPine (NORVASC) 10 MG tablet [Pharmacy Med Name: amLODIPine Besylate Oral Tablet 10 MG] 90 tablet 0     Sig: Take 1 tablet (10 mg total) by mouth daily.       Calcium-Channel Blockers Protocol Failed - 6/26/2020  7:01 AM        Failed - PCP or prescribing provider visit in past 12 months or next 3 months     Last office visit with prescriber/PCP: 3/11/2019 Duke Olivas MD OR same dept: Visit date not found OR same specialty: 3/11/2019 Duke Olivas MD  Last physical: 4/10/2019 Last MTM visit: Visit date not found   Next visit within 3 mo: Visit date not found  Next physical within 3 mo: Visit date not found  Prescriber OR PCP: Duke Olivas MD  Last diagnosis associated with med order: 1. Essential hypertension  - amLODIPine (NORVASC) 10 MG tablet [Pharmacy Med Name: amLODIPine Besylate Oral Tablet 10 MG]; Take 1 tablet (10 mg total) by mouth daily.  Dispense: 90 tablet; Refill: 0    If protocol passes may refill for 12 months if within 3 months of last provider visit (or a total of 15 months).             Failed - Blood pressure filed in past 12 months     BP Readings from Last 1 Encounters:   05/09/19 118/62

## 2021-06-11 NOTE — TELEPHONE ENCOUNTER
RN cannot approve Refill Request    RN can NOT refill this medication PCP messaged that patient is overdue for Labs. Last office visit: 3/11/2019 Duke Olivas MD Last Physical: 4/10/2019 Last MTM visit: Visit date not found Last visit same specialty: 3/11/2019 Duke Olivas MD.  Next visit within 3 mo: Visit date not found  Next physical within 3 mo: Visit date not found      Trisha Patino, [facility] Triage/Med Refill 9/28/2020    Requested Prescriptions   Pending Prescriptions Disp Refills     lisinopriL (PRINIVIL,ZESTRIL) 20 MG tablet [Pharmacy Med Name: Lisinopril Oral Tablet 20 MG] 90 tablet 0     Sig: Take 1 tablet (20 mg total) by mouth daily.       Ace Inhibitors Refill Protocol Failed - 9/25/2020  2:00 AM        Failed - PCP or prescribing provider visit in past 12 months       Last office visit with prescriber/PCP: 3/11/2019 Duke Olivas MD OR same dept: Visit date not found OR same specialty: 3/11/2019 Duke Olivas MD  Last physical: 4/10/2019 Last MTM visit: Visit date not found   Next visit within 3 mo: Visit date not found  Next physical within 3 mo: Visit date not found  Prescriber OR PCP: Duke Olivas MD  Last diagnosis associated with med order: 1. Essential hypertension  - lisinopriL (PRINIVIL,ZESTRIL) 20 MG tablet [Pharmacy Med Name: Lisinopril Oral Tablet 20 MG]; Take 1 tablet (20 mg total) by mouth daily.  Dispense: 90 tablet; Refill: 0    If protocol passes may refill for 12 months if within 3 months of last provider visit (or a total of 15 months).             Failed - Serum Potassium in past 12 months     No results found for: LN-POTASSIUM          Failed - Serum Creatinine in past 12 months     Creatinine   Date Value Ref Range Status   09/28/2017 0.84 0.70 - 1.30 mg/dL Final             Passed - Blood pressure filed in past 12 months     BP Readings from Last 1 Encounters:   09/03/20 120/80

## 2021-06-11 NOTE — PROGRESS NOTES
FOOT AND ANKLE SURGERY/PODIATRY CONSULT NOTE         ASSESSMENT:   Pronation deformity  Contracted tendon  Dermatitis right foot      TREATMENT:  I have recommended a new pair of orthotics.  The patient is to return to the clinic as needed.  The patient was given a prescription for Lotrisone cream to be applied to the right foot daily for the next 3 weeks.        HPI:Jan Campa presented to the clinic today requesting a new pair of orthotics.  The patient has been diagnosed with pronation deformity and a contracted Achilles tendon.  In 2017 he was recommended orthotics.  He has been wearing his orthotics fairly consistently since that time.  His symptoms are markedly improved as long as he is wearing his orthotics.  He stated that he started to notice a little bit of discomfort and determined that his orthotics are worn and he needs a new pair at this time.      Past Medical History:   Diagnosis Date     Arthritis     degenerative     Hypertension      Ventral hernia        Past Surgical History:   Procedure Laterality Date     NO PAST SURGERIES         Allergies   Allergen Reactions     Apple Swelling     States his throat swells         Current Outpatient Medications:      amLODIPine (NORVASC) 10 MG tablet, Take 1 tablet (10 mg total) by mouth daily., Disp: 90 tablet, Rfl: 0     dextromethorphan (DELSYM) 30 mg/5 mL liquid, Take 60 mg by mouth 2 (two) times a day as needed for cough., Disp: , Rfl:      lisinopriL (PRINIVIL,ZESTRIL) 20 MG tablet, Take 1 tablet (20 mg total) by mouth daily., Disp: 90 tablet, Rfl: 1     naproxen sodium (ALEVE) 220 MG tablet, Take 220 mg by mouth every morning.    , Disp: , Rfl:      omega-3 fatty acids (FISH OIL CONCENTRATE ORAL), Take 3 capsules by mouth daily. [place] [employer] - suggested by eye doctor, Disp: , Rfl:      oxyCODONE-acetaminophen (PERCOCET) 5-325 mg per tablet, Take 1-2 tablets by mouth every 4 (four) hours as needed for pain., Disp: 25 tablet, Rfl: 0      petrolat,wht/min oil/sod chl (DRY EYES OPHT), Administer 1 drop to both eyes daily., Disp: , Rfl:      traMADol (ULTRAM) 50 mg tablet, TAKE 1 TABLET BY MOUTH THREE TIMES DAILY AS NEEDED, Disp: 30 tablet, Rfl: 0    Family History   Problem Relation Age of Onset     Breast cancer Mother      Kidney cancer Father      Prostate cancer Father      Cancer Maternal Grandmother      Cancer Maternal Grandfather      Cancer Paternal Grandmother      Cancer Paternal Grandfather        Social History     Socioeconomic History     Marital status:      Spouse name: Not on file     Number of children: Not on file     Years of education: Not on file     Highest education level: Not on file   Occupational History     Not on file   Social Needs     Financial resource strain: Not on file     Food insecurity     Worry: Not on file     Inability: Not on file     Transportation needs     Medical: Not on file     Non-medical: Not on file   Tobacco Use     Smoking status: Never Smoker     Smokeless tobacco: Never Used   Substance and Sexual Activity     Alcohol use: Yes     Comment: 2-3 beers weekly     Drug use: No     Sexual activity: Not on file   Lifestyle     Physical activity     Days per week: Not on file     Minutes per session: Not on file     Stress: Not on file   Relationships     Social connections     Talks on phone: Not on file     Gets together: Not on file     Attends [employer] service: Not on file     Active member of club or organization: Not on file     Attends meetings of clubs or organizations: Not on file     Relationship status: Not on file     Intimate partner violence     Fear of current or ex partner: Not on file     Emotionally abused: Not on file     Physically abused: Not on file     Forced sexual activity: Not on file   Other Topics Concern     Not on file   Social History Narrative     Not on file       Review of Systems - Patient denies fever, chills, rash, wound, stiffness, limping, numbness,  weakness, heart burn, blood in stool, chest pain with activity, calf pain when walking, shortness of breath with activity, chronic cough, easy bleeding/bruising, swelling of ankles, excessive thirst, fatigue, depression, anxiety.  Patient admits to mild pain bilateral feet.      OBJECTIVE:  Appearance: alert, well appearing, and in no distress.    There were no vitals filed for this visit.    BMI= There is no height or weight on file to calculate BMI.    General appearance: Patient is alert and fully cooperative with history & exam.  No sign of distress is noted during the visit.  Psychiatric: Affect is pleasant & appropriate.  Patient appears motivated to improve health.  Respiratory: Breathing is regular & unlabored while sitting.  HEENT: Hearing is intact to spoken word.  Speech is clear.  No gross evidence of visual impairment that would impact ambulation.    Vascular: Dorsalis pedis and posterior tibial pulses are palpable. There is no pedal hair growth bilaterally.  CFT < 3 sec from anterior tibial surface to distal digits bilaterally. There is no appreciable edema noted.  Dermatologic: Turgor and texture are within normal limits. No coloration or temperature changes. No primary or secondary lesions noted.  Neurologic: All epicritic and proprioceptive sensations are grossly intact bilaterally.  Musculoskeletal: All active and passive ankle, subtalar, midtarsal, and 1st MPJ range of motion are grossly intact without pain or crepitus, with the exception of none. Manual muscle strength is within normal limits bilaterally. All dorsiflexors, plantarflexors, invertors, evertors are intact bilaterally.  Minimal tenderness present to the medial longitudinal arch bilaterally on palpation.  No tenderness to bilateral feet or ankles with range of motion. Calf is soft/non-tender without warmth/induration    Imaging:         No results found.       Rene Mello; REINALDO  [facility] Foot & Ankle Surgery/Podiatry

## 2021-06-13 NOTE — PROGRESS NOTES
HPI:   Jan Campa is a 67 y.o. male referred to see me by Duke Olivas MD for evaluation of an enlarging ventral hernia.  The patient states that it has been present for 3-4 years.  It is located just above his umbilicus.  He can partially reduce it.  He denies it causing any discomfort.  It has been getting larger over time.  He denies any prior abdominal surgery.    Allergies:  Review of patient's allergies indicates no known allergies.    Past Medical History:   Diagnosis Date     Hypertension    Chronic back pain    No past surgical history on file.    CURRENT MEDS:    Current Outpatient Prescriptions:      amLODIPine (NORVASC) 5 MG tablet, TAKE 1 TABLET BY MOUTH DAILY, Disp: 90 tablet, Rfl: 0     cetirizine (ZYRTEC) 10 MG chewable tablet, Chew 10 mg daily., Disp: , Rfl:      clotrimazole-betamethasone (LOTRISONE) cream, APPLY EXTERNALLY TO THE AFFECTED AREA TWICE DAILY, Disp: 15 g, Rfl: 0     lisinopril (PRINIVIL,ZESTRIL) 20 MG tablet, TAKE 1 TABLET BY MOUTH EVERY DAY, Disp: 90 tablet, Rfl: 0     naproxen sodium (ALEVE) 220 MG tablet, Take 220 mg by mouth 2 (two) times a day with meals., Disp: , Rfl:      traMADol (ULTRAM) 50 mg tablet, TAKE 1 TABLET BY MOUTH THREE TIMES DAILY AS NEEDED, Disp: 30 tablet, Rfl: 0     UNABLE TO FIND, [place] [employer] Name:, Disp: , Rfl:     Family history:  Family History   Problem Relation Age of Onset     Breast cancer Mother      Kidney cancer Father      Prostate cancer Father      Cancer Maternal Grandmother      Cancer Maternal Grandfather      Cancer Paternal Grandmother      Cancer Paternal Grandfather        Social history:   reports that he has never smoked. He has never used smokeless tobacco. He reports that he drinks alcohol. He reports that he does not use illicit drugs.   Drinks 2-3 beers/week    Review of Systems:  General: No complaints or constitutional symptoms  Skin: No rashes  Hematologic/Lymphatic: No symptoms or complaints  Psychiatric: No symptoms  "or complaints  Endocrine: No excessive fatigue, no hypermetabolic symptoms reported  Respiratory: No cough, shortness of breath, or wheezing  Cardiovascular: No chest pain or dyspnea on exertion  Gastrointestinal: Epigastric hernia.  Denies abdominal pain or GI complaints.  Musculoskeletal: No recent injuries reported  Neurological: No focal neurologic defects reported  Breast: No discharge, skin changes, or palpable masses    EXAM:  BP (!) 168/96  Pulse 81  Ht 5' 10\" (1.778 m)  Wt (!) 279 lb (126.6 kg)  SpO2 98%  BMI 40.03 kg/m2  Body mass index is 40.03 kg/(m^2).  General : Alert, cooperative, appears stated age   Skin: Skin color, texture, turgor normal, no rashes or lesions   Lymphatic: No obvious adenopathy, no swelling   Eyes: No scleral icterus, pupils equal  HENT: no traumatic injury to the head or face, no gross abnormalities  Lungs: Normal respiratory effort, breath sounds equal bilaterally  Heart: Regular rate and rhythm  Abdomen: Soft, nondistended, and nontender to palpation.  Moderate sized midline, supraumbilical incarcerated hernia.  Musculoskeletal: No obvious swelling  Neurologic: Grossly intact    LABS:    Results from last 7 days  Lab Units 09/28/17  1145   LN-SODIUM mmol/L 141   LN-POTASSIUM mmol/L 4.5   LN-CHLORIDE mmol/L 107   LN-CO2 mmol/L 25   LN-BLOOD UREA NITROGEN mg/dL 15   LN-CREATININE mg/dL 0.84   LN-CALCIUM mg/dL 8.9     Lab Results   Component Value Date    ALT 13 09/28/2017    AST 15 09/28/2017    ALKPHOS 46 09/28/2017    BILITOT 0.6 09/28/2017       IMAGES:   None    Assessment/Plan:   1. Incarcerated ventral hernia        Jan Campa is a 67 y.o. male with signs and symptoms consistent with a ventral hernia.  I have explained the pathophysiology of hernias in detail as well as the surgical versus non-operative management strategies.  I would recommend an open ventral hernia repair with mesh.    The risks of surgery were discussed in detail which include, but are not limited " to, bleeding, infection, injury to surrounding structures, blood clots, stroke, heart attack and death.  Additionally, the risks of non operative management were discussed which include, but are not limited to, incarceration and continued progression in size of the hernia.  The postoperative restrictions were also discussed with him including no driving until he is pain-free and no heavy lifting for 4 weeks.  He would be able to resume swimming after 2 weeks.    He understands everything which was discussed and would like to think about whether or not he wants to proceed with surgery.  He has our contact information and will call back to schedule surgery at his convenience.     Maris Fry DO   [phone]  [facility]

## 2021-06-13 NOTE — TELEPHONE ENCOUNTER
RN cannot approve Refill Request    RN can NOT refill this medication Protocol failed and NO refill given. Last office visit: Visit date not found Last Physical: Visit date not found Last MTM visit: Visit date not found Last visit same specialty: 3/11/2019 Duke Olivas MD.  Next visit within 3 mo: Visit date not found  Next physical within 3 mo: Visit date not found      Angela Olmstead, [facility] [place] Connection Triage/Med Refill 12/16/2020    Requested Prescriptions   Pending Prescriptions Disp Refills     amLODIPine (NORVASC) 10 MG tablet [Pharmacy Med Name: amLODIPine Besylate Oral Tablet 10 MG] 30 tablet 0     Sig: TAKE ONE TABLET BY MOUTH ONE TIME DAILY       Calcium-Channel Blockers Protocol Failed - 12/15/2020  1:00 PM        Failed - PCP or prescribing provider visit in past 12 months or next 3 months     Last office visit with prescriber/PCP: Visit date not found OR same dept: Visit date not found OR same specialty: 3/11/2019 Duke Olivas MD  Last physical: Visit date not found Last MTM visit: Visit date not found   Next visit within 3 mo: Visit date not found  Next physical within 3 mo: Visit date not found  Prescriber OR PCP: Dilshad Desouza MD  Last diagnosis associated with med order: 1. Essential hypertension  - amLODIPine (NORVASC) 10 MG tablet [Pharmacy Med Name: amLODIPine Besylate Oral Tablet 10 MG]; TAKE ONE TABLET BY MOUTH ONE TIME DAILY   Dispense: 30 tablet; Refill: 0    If protocol passes may refill for 12 months if within 3 months of last provider visit (or a total of 15 months).             Passed - Blood pressure filed in past 12 months     BP Readings from Last 1 Encounters:   09/03/20 120/80

## 2021-06-13 NOTE — PROGRESS NOTES
[facility] Clinic Follow Up Note    Jan Campa   67 y.o. male    Date of Visit: 9/28/2017    Chief Complaint   Patient presents with     Follow-up     blood presure     Nevus     on lower lip     Pain     foot     Subjective  This is a 67-year-old man with known hypertension who has not been in the office in a while.  He came in today to follow-up on his blood pressure and also because of several other issues.  He has some ongoing chronic back issues and has seen the spinal physicians in the past.  He thinks the back is slowly worsening and his walking ability is becoming increasingly limited.  He is rapidly approaching the point where he will need to see them again.  He has had some ongoing foot pain that seems to be getting worse and gives him some trouble in walking as well.  He had seen the podiatrist in the past.  He is wondering about another referral.  The next issue is a lesion on his lower lip.  This had not been there in the past but in the past several months has appeared and is actually darkened somewhat in color.  This also needs to be looked at.  He also has a chronic problem with a ventral hernia which he thinks is beginning to enlarge.  It does not cause any particular discomfort but again, is probably increasing in size.  He offers no other particular complaints today and is been on the same medication.    ROS A comprehensive review of systems was performed and was otherwise negative    Medications, allergies, and problem list were reviewed and updated    Exam  General Appearance:   On examination his blood pressure is a little borderline at 142/80.  Weight is 279 pounds and height is 70 inches.  BMI is 40.03.    Neck is supple with no masses.    Heart is in a sinus rhythm with a rate of 76 and no ectopy.  No gallops or murmurs.    Abdomen is soft and nontender.  He does have a large ventral hernia.    No swelling of the feet or ankles.    The patient is alert and oriented  ×3.      Assessment/Plan  1. Essential hypertension with goal blood pressure less than 140/90  Comprehensive Metabolic Panel   2. Need for vaccination  Influenza High Dose, Seasonal 65+ yrs    Pneumococcal conjugate vaccine 13-valent 6wks-17yrs; >50yrs   3. Lumbago     4. Foot pain  Ambulatory referral to Podiatry   5. Lip lesion  Ambulatory referral to Dermatology   6. Ventral hernia  Ambulatory referral to General Surgery   7. Healthcare maintenance  Lipid [place]     Hypertension.  Little borderline today but I would continue his current medication.    Chronic back pain.  He will probably need to see the spinal doctors again relatively soon.    Lip lesion.  We will refer him to dermatology.    Worsening foot pain.  We will have him see the podiatrist.    Enlarging ventral hernia.  I discussed this with him and would recommend that he at least get a surgical opinion regarding possible repair.    We will do CMP and lipids today and contact him with those results.  Total time of this office visit was 25 minutes with greater than 50% of the time spent in care coordination and patient counseling.  The following high BMI interventions were performed this visit: weight monitoring    Duke Olivas MD      Current Outpatient Prescriptions on File Prior to Visit   Medication Sig     amLODIPine (NORVASC) 5 MG tablet TAKE 1 TABLET BY MOUTH DAILY     cetirizine (ZYRTEC) 10 MG chewable tablet Chew 10 mg daily.     lisinopril (PRINIVIL,ZESTRIL) 20 MG tablet TAKE 1 TABLET BY MOUTH EVERY DAY     traMADol (ULTRAM) 50 mg tablet TAKE 1 TABLET BY MOUTH THREE TIMES DAILY AS NEEDED     clotrimazole-betamethasone (LOTRISONE) cream APPLY EXTERNALLY TO THE AFFECTED AREA TWICE DAILY     diclofenac (CATAFLAM) 50 MG tablet TAKE 1 TABLET BY MOUTH 2 TIMES DAILY AS NEEDED     multivitamin therapeutic (THERAGRAN) tablet Take 1 tablet by mouth daily.     [DISCONTINUED] amLODIPine (NORVASC) 5 MG tablet TAKE 1 TABLET BY MOUTH DAILY      [DISCONTINUED] amLODIPine (NORVASC) 5 MG tablet TAKE 1 TABLET BY MOUTH DAILY     [DISCONTINUED] lisinopril (PRINIVIL,ZESTRIL) 20 MG tablet TAKE 1 TABLET BY MOUTH EVERY DAY     [DISCONTINUED] lisinopril (PRINIVIL,ZESTRIL) 20 MG tablet TAKE 1 TABLET BY MOUTH EVERY DAY     [DISCONTINUED] traMADol (ULTRAM) 50 mg tablet TAKE 1 TABLET BY MOUTH THREE TIMES DAILY AS NEEDED     [DISCONTINUED] traMADol (ULTRAM) 50 mg tablet TAKE 1 TABLET BY MOUTH THREE TIMES DAILY AS NEEDED     No current facility-administered medications on file prior to visit.      No Known Allergies  Social History   Substance Use Topics     Smoking status: Never Smoker     Smokeless tobacco: Never Used     Alcohol use Yes      Comment: 4 cans of beer/week

## 2021-06-13 NOTE — TELEPHONE ENCOUNTER
RN cannot approve Refill Request    RN can NOT refill this medication Protocol failed and NO refill given. Last office visit: 3/11/2019 Duke Olivas MD Last Physical: 4/10/2019 Last MTM visit: Visit date not found Last visit same specialty: 3/11/2019 Duke Olivas MD.  Next visit within 3 mo: Visit date not found  Next physical within 3 mo: Visit date not found      Angela Olmstead, [facility] Triage/Med Refill 11/16/2020    Requested Prescriptions   Pending Prescriptions Disp Refills     amLODIPine (NORVASC) 10 MG tablet [Pharmacy Med Name: amLODIPine Besylate Oral Tablet 10 MG] 90 tablet 0     Sig: TAKE ONE TABLET BY MOUTH ONE TIME DAILY       Calcium-Channel Blockers Protocol Failed - 11/15/2020 11:51 AM        Failed - PCP or prescribing provider visit in past 12 months or next 3 months     Last office visit with prescriber/PCP: 3/11/2019 Duke Olivas MD OR same dept: Visit date not found OR same specialty: 3/11/2019 Duke Olivas MD  Last physical: 4/10/2019 Last MTM visit: Visit date not found   Next visit within 3 mo: Visit date not found  Next physical within 3 mo: Visit date not found  Prescriber OR PCP: Duke Olivas MD  Last diagnosis associated with med order: 1. Essential hypertension  - amLODIPine (NORVASC) 10 MG tablet [Pharmacy Med Name: amLODIPine Besylate Oral Tablet 10 MG]; TAKE ONE TABLET BY MOUTH ONE TIME DAILY   Dispense: 90 tablet; Refill: 0    If protocol passes may refill for 12 months if within 3 months of last provider visit (or a total of 15 months).             Passed - Blood pressure filed in past 12 months     BP Readings from Last 1 Encounters:   09/03/20 120/80

## 2021-06-14 NOTE — TELEPHONE ENCOUNTER
Referral Requested  Referral being requested: [facility] - Patient is due for coloscopy  Reason service is needed/diagnosis: Health maintenance  When is the referral needed: Now  Where to send referral (if applicable): Epic

## 2021-06-14 NOTE — TELEPHONE ENCOUNTER
Patient has set up an appointment to establish care with Dr. Dooley in the provider's first available slot which is 3/17/21.  Patient will need medication refilled prior to the appointment.  Please fill today.

## 2021-06-14 NOTE — PROGRESS NOTES
Admission History & Physical  Jan Campa, 1950, [id number]    [facility]  Duke Olivas MD, [phone]    Extended Emergency Contact Information  Primary Emergency Contact: Idania Campa  Address: [facility] [place] [address] [place]  Home Phone: [phone]  Mobile Phone: [phone]  Relation: Spouse     Assessment and Plan:   Assessment: Pronation deformity, contracted Achilles tendon  Plan: I have recommended orthotics  Active Problems:    * No active hospital problems. *      Chief Complaint:  Arch pain both feet     HPI:    Jan Campa is a 67 y.o. old male who presented to the clinic today complaining of some moderate arch pain involving both feet primarily the right foot.  The patient stated that the pain is mild to moderate.  The pain is noticed primarily with weightbearing and ambulation.  He has not had any redness or swelling.  The pain is completely relieved with nonweightbearing.  The patient has been wearing off orthotics for the past 13 years.  He has not had a new pair for several years.  History is provided by patient    Medical History  Active Ambulatory (Non-Hospital) Problems    Diagnosis     Vertigo     Backache     Cerumen Impaction     Essential Hypertension     Lower Back Pain     Past Medical History:   Diagnosis Date     Hypertension      Patient Active Problem List    Diagnosis Date Noted     Vertigo      Backache      Cerumen Impaction      Essential Hypertension      Lower Back Pain      Surgical History  He  has no past surgical history on file.   History reviewed. No pertinent surgical history. Social History  Reviewed, and he  reports that he has never smoked. He has never used smokeless tobacco. He reports that he drinks alcohol. He reports that he does not use illicit drugs.  Social History   Substance Use Topics     Smoking status: Never Smoker     Smokeless tobacco: Never Used     Alcohol use Yes      Comment: 4  cans of beer/week      Allergies  No Known Allergies Family History  Reviewed, and family history includes Breast cancer in his mother; Cancer in his maternal grandfather, maternal grandmother, paternal grandfather, and paternal grandmother; Kidney cancer in his father; Prostate cancer in his father.   Psychosocial Needs  Social History     Social History Narrative     Additional psychosocial needs reviewed per nursing assessment.       Prior to Admission Medications     (Not in a hospital admission)        Review of Systems - Negative     BP (!) 170/94  Pulse 88  Temp 97.8  F (36.6  C) (Temporal)   SpO2 99%    Objective findings: General: The patient is alert and in no acute distress      Integument: Nails bilateral feet are normal length but 2 times normal thickness and severely discolored.   Skin bilateral feet warm supple and intact.      Vascular: DP and PT pulses +2/4 bilateral feet.  Capillary refill less than 2 seconds bilateral feet.      Neurologic: Negative clonus, negative Babinski bilaterally.      Musculoskeletal: Range of motion within normal limits bilaterally.  Muscle power +4/5 bilaterally in all compartments.  There is severe flattening of the medial longitudinal arch noted bilaterally.  There is a decrease in amount of dorsiflexion available at both ankle joints in both feet are maximally dorsiflexed with the leg extended.  There is mild pain on palpation of the medial longitudinal arch of the right foot.  There is mild medial ptosis of the talar head bilaterally.      Assessment: Pronation deformity, contracted Achilles tendon bilaterally          Plan: I have recommended new orthotics with a heel lift.  The patient is to return to the clinic as needed.

## 2021-06-14 NOTE — TELEPHONE ENCOUNTER
RN cannot approve Refill Request    RN can NOT refill this medication PCP messaged that patient is overdue for Office Visit. Last office visit: 3/11/2019 Duke Olivas MD Last Physical: 4/10/2019 Last MTM visit: Visit date not found Last visit same specialty: 3/11/2019 Duke Olivas MD.  Next visit within 3 mo: Visit date not found  Next physical within 3 mo: Visit date not found      Harriett Santizo, [facility] Triage/Med Refill 1/13/2021    Requested Prescriptions   Pending Prescriptions Disp Refills     amLODIPine (NORVASC) 10 MG tablet [Pharmacy Med Name: AMLODIPINE BESYLATE 10 MG TAB] 90 tablet 3     Sig: TAKE 1 TABLET BY MOUTH EVERY DAY       Calcium-Channel Blockers Protocol Failed - 1/13/2021 12:03 PM        Failed - PCP or prescribing provider visit in past 12 months or next 3 months     Last office visit with prescriber/PCP: 3/11/2019 Duke Olivas MD OR same dept: Visit date not found OR same specialty: 3/11/2019 Duke Olivas MD  Last physical: 4/10/2019 Last MTM visit: Visit date not found   Next visit within 3 mo: Visit date not found  Next physical within 3 mo: Visit date not found  Prescriber OR PCP: Duke Olivas MD  Last diagnosis associated with med order: 1. Essential hypertension  - amLODIPine (NORVASC) 10 MG tablet [Pharmacy Med Name: AMLODIPINE BESYLATE 10 MG TAB]; TAKE 1 TABLET BY MOUTH EVERY DAY  Dispense: 90 tablet; Refill: 3    If protocol passes may refill for 12 months if within 3 months of last provider visit (or a total of 15 months).             Passed - Blood pressure filed in past 12 months     BP Readings from Last 1 Encounters:   09/03/20 120/80

## 2021-06-14 NOTE — TELEPHONE ENCOUNTER
Refill Request  Did you contact pharmacy: Yes  Medication name: Lisinipril 20mg tabs  Requested Prescriptions      No prescriptions requested or ordered in this encounter     Who prescribed the medication: Ashleigh Mars  Requested Pharmacy: [facility]  Is patient out of medication: Yes   Patient is picking up refill for Amlodipine besylate 10 mg tabs but wanted to  the Lisinipril at the same time.  Patient notified refills processed in 3 business days:  yes  Okay to leave a detailed message: yes

## 2021-06-15 NOTE — TELEPHONE ENCOUNTER
RN cannot approve Refill Request    RN can NOT refill this medication PCP messaged that patient is overdue for Labs and Office Visit. Last office visit: Visit date not found Last Physical: Visit date not found Last MTM visit: Visit date not found Last visit same specialty: 3/11/2019 Duke Olivas MD.  Next visit within 3 mo: Visit date not found  Next physical within 3 mo: Visit date not found      Harriett Santizo, [facility] Triage/Med Refill 2/11/2021    Requested Prescriptions   Pending Prescriptions Disp Refills     lisinopriL (PRINIVIL,ZESTRIL) 20 MG tablet 90 tablet 0     Sig: Take 1 tablet (20 mg total) by mouth daily.       Ace Inhibitors Refill Protocol Failed - 2/11/2021  8:39 AM        Failed - PCP or prescribing provider visit in past 12 months       Last office visit with prescriber/PCP: Visit date not found OR same dept: Visit date not found OR same specialty: 3/11/2019 Duke Olivas MD  Last physical: Visit date not found Last MTM visit: Visit date not found   Next visit within 3 mo: Visit date not found  Next physical within 3 mo: Visit date not found  Prescriber OR PCP: Navdeep Carter MD  Last diagnosis associated with med order: 1. Essential hypertension  - lisinopriL (PRINIVIL,ZESTRIL) 20 MG tablet; Take 1 tablet (20 mg total) by mouth daily.  Dispense: 90 tablet; Refill: 0    If protocol passes may refill for 12 months if within 3 months of last provider visit (or a total of 15 months).             Failed - Serum Potassium in past 12 months     No results found for: LN-POTASSIUM          Failed - Serum Creatinine in past 12 months     Creatinine   Date Value Ref Range Status   09/28/2017 0.84 0.70 - 1.30 mg/dL Final             Passed - Blood pressure filed in past 12 months     BP Readings from Last 1 Encounters:   09/03/20 120/80

## 2021-06-15 NOTE — PROGRESS NOTES
Office Visit - Follow Up   Jan Campa   67 y.o. male    Date of Visit: 1/19/2018    Chief Complaint   Patient presents with     Fall        Assessment and Plan   1. Pain of left lower extremity  I personally looked at his x-ray and I do not see a fracture but we are waiting for the official radiology read.  I think he bruised his shin and has had some bleeding which has traveled dependently.  I recommended leg elevation, ice, rest and time.  Obviously if there is a fracture he will see orthopedic surgery.  Low suspicion for DVT.  - XR Tibia and Fibula Left; Future    2. Leg swelling  His leg swelling is actually bilateral probably dependent edema and he is also on amlodipine.  Leg swelling does not seem to bother him too much.  He does take amlodipine in the morning.  His blood pressure is elevated and we discussed increasing this.  He is agreeable to this and I recommended he take it at night with lisinopril to reduce the risk of swelling although he was stuck to Dr. Duke Olivas first.  - XR Tibia and Fibula Left; Future    3. Bruise   XR Tibia and Fibula Left; Future    4. Essential hypertension  See above      Return in about 1 week (around 1/26/2018) for follow up with PCP.     History of Present Illness   This 67 y.o. old patient of Dr. Duke Olivas comes in for evaluation of left leg pain, swelling, bruising.  6 days ago he was at a car show.  He was standing up on some steps and he stepped backwards.  His left leg fell off a platform and he suffered a bruise on his shin along with an abrasion.  He has been able to walk without difficulty.  He has noticed more swelling in that leg especially over the area of bruising and some bruising/bleeding into his foot dependently.  He has pain with palpation along the shin anteriorly.  No pain posteriorly in his calf.    Review of Systems: A comprehensive review of systems was negative except as noted.     Medications, Allergies and Problem List   Reviewed and  "updated     Physical Exam   General Appearance:   No acute distress    /88 (Patient Site: Left Arm, Patient Position: Sitting, Cuff Size: Adult Regular)  Pulse 87  Ht 5' 10\" (1.778 m)  Wt (!) 281 lb (127.5 kg)  SpO2 98%  BMI 40.32 kg/m2    He has a healing abrasion on the anterior superior aspect of his lower leg.  He has a bruise about the shin at that point and is tender to palpation.  He has swelling of both his left and right legs which is somewhat symmetric.  He has dependent bruising in his foot.  No pain with palpation of the calves.  Peripheral pulses are intact.  Some numbness with palpation along the left lower lateral leg.     Additional Information   Current Outpatient Prescriptions   Medication Sig Dispense Refill     amLODIPine (NORVASC) 10 MG tablet Take 1 tablet (10 mg total) by mouth daily. 90 tablet 3     cetirizine (ZYRTEC) 10 MG chewable tablet Chew 10 mg daily.       clotrimazole-betamethasone (LOTRISONE) cream APPLY EXTERNALLY TO THE AFFECTED AREA TWICE DAILY 15 g 0     lisinopril (PRINIVIL,ZESTRIL) 20 MG tablet TAKE 1 TABLET BY MOUTH EVERY DAY 90 tablet 0     lisinopril (PRINIVIL,ZESTRIL) 20 MG tablet TAKE 1 TABLET BY MOUTH EVERY DAY 90 tablet 3     naproxen sodium (ALEVE) 220 MG tablet Take 220 mg by mouth 2 (two) times a day with meals.       traMADol (ULTRAM) 50 mg tablet TAKE 1 TABLET BY MOUTH THREE TIMES DAILY AS NEEDED 30 tablet 0     traMADol (ULTRAM) 50 mg tablet TAKE 1 TABLET BY MOUTH THREE TIMES DAILY AS NEEDED 30 tablet 0     UNABLE TO FIND [place] [facility] Name:       No current facility-administered medications for this visit.      No Known Allergies  Social History   Substance Use Topics     Smoking status: Never Smoker     Smokeless tobacco: Never Used     Alcohol use Yes      Comment: 4 cans of beer/week       Review and/or order of clinical lab tests:  Review and/or order of radiology tests:  Review and/or order of medicine tests:  Discussion of test results with " performing physician:  Decision to obtain old records and/or obtain history from someone other than the patient:  Review and summarization of old records and/or obtaining history from someone other than the patient and.or discussion of case with another health care provider:  Independent visualization of image, tracing or specimen itself:    Time:      Duke Orona MD

## 2021-06-15 NOTE — TELEPHONE ENCOUNTER
RN cannot approve Refill Request    RN can NOT refill this medication PCP messaged that patient is overdue for Office Visit. Last office visit: 3/11/2019 Duke Olivas MD Last Physical: 4/10/2019 Last MTM visit: Visit date not found Last visit same specialty: 3/11/2019 Duke Olivas MD.  Next visit within 3 mo: Visit date not found  Next physical within 3 mo: Visit date not found      Harriett Santizo, [facility] Triage/Med Refill 2/11/2021    Requested Prescriptions   Pending Prescriptions Disp Refills     amLODIPine (NORVASC) 10 MG tablet 30 tablet 0     Sig: Take 1 tablet (10 mg total) by mouth daily.       Calcium-Channel Blockers Protocol Failed - 2/11/2021  8:39 AM        Failed - PCP or prescribing provider visit in past 12 months or next 3 months     Last office visit with prescriber/PCP: 3/11/2019 Duke Olivas MD OR same dept: Visit date not found OR same specialty: 3/11/2019 Duke Olivas MD  Last physical: 4/10/2019 Last MTM visit: Visit date not found   Next visit within 3 mo: Visit date not found  Next physical within 3 mo: Visit date not found  Prescriber OR PCP: Duke Olivas MD  Last diagnosis associated with med order: 1. Essential hypertension  - amLODIPine (NORVASC) 10 MG tablet; Take 1 tablet (10 mg total) by mouth daily.  Dispense: 30 tablet; Refill: 0    If protocol passes may refill for 12 months if within 3 months of last provider visit (or a total of 15 months).             Passed - Blood pressure filed in past 12 months     BP Readings from Last 1 Encounters:   09/03/20 120/80

## 2021-06-15 NOTE — TELEPHONE ENCOUNTER
Former patient of Deisy & has not established care with another provider.  Please assign refill request to covering provider per Clinic standard process.      RN cannot approve Refill Request    RN can NOT refill this medication Protocol failed and NO refill given and no pcp. Last office visit: Visit date not found Last Physical: Visit date not found Last MTM visit: Visit date not found Last visit same specialty: 3/11/2019 Duke Olivas MD.  Next visit within 3 mo: Visit date not found  Next physical within 3 mo: Visit date not found      Warren Fletcher, [facility] Connection Triage/Med Refill 3/9/2021    Requested Prescriptions   Pending Prescriptions Disp Refills     amLODIPine (NORVASC) 10 MG tablet [Pharmacy Med Name: AMLODIPINE BESYLATE 10 MG TAB] 30 tablet 0     Sig: TAKE 1 TABLET BY MOUTH EVERY DAY. NEED TO ESTABLISH CARE WITH NEW MD       Calcium-Channel Blockers Protocol Failed - 3/9/2021  7:33 AM        Failed - PCP or prescribing provider visit in past 12 months or next 3 months     Last office visit with prescriber/PCP: Visit date not found OR same dept: Visit date not found OR same specialty: 3/11/2019 Duke Olivas MD  Last physical: Visit date not found Last MTM visit: Visit date not found   Next visit within 3 mo: Visit date not found  Next physical within 3 mo: Visit date not found  Prescriber OR PCP: Navdeep Carter MD  Last diagnosis associated with med order: 1. Essential hypertension  - amLODIPine (NORVASC) 10 MG tablet [Pharmacy Med Name: AMLODIPINE BESYLATE 10 MG TAB]; TAKE 1 TABLET BY MOUTH EVERY DAY. NEED TO ESTABLISH CARE WITH NEW MD  Dispense: 30 tablet; Refill: 0    If protocol passes may refill for 12 months if within 3 months of last provider visit (or a total of 15 months).             Passed - Blood pressure filed in past 12 months     BP Readings from Last 1 Encounters:   09/03/20 120/80

## 2021-06-15 NOTE — TELEPHONE ENCOUNTER
Former patient of Deisy & has not established care with another provider.  Please assign refill request to covering provider per Clinic standard process.      RN cannot approve Refill Request    RN can NOT refill this medication   RN cannot approve Refill Request    RN can NOT refill this medication Protocol failed and NO refill given and no pcp. Last office visit: 3/11/2019 Duke Olivas MD Last Physical: 4/10/2019 Last MTM visit: Visit date not found Last visit same specialty: 3/11/2019 Duke Olivas MD.  Next visit within 3 mo: Visit date not found  Next physical within 3 mo: Visit date not found      Warren Fletcher, [facility] Triage/Med Refill 2/11/2021    Requested Prescriptions   Pending Prescriptions Disp Refills     amLODIPine (NORVASC) 10 MG tablet 90 tablet 3     Sig: Take 1 tablet (10 mg total) by mouth daily.       Calcium-Channel Blockers Protocol Failed - 2/11/2021  2:46 PM        Failed - PCP or prescribing provider visit in past 12 months or next 3 months     Last office visit with prescriber/PCP: 3/11/2019 Duke Olivas MD OR same dept: Visit date not found OR same specialty: 3/11/2019 Duke Olivas MD  Last physical: 4/10/2019 Last MTM visit: Visit date not found   Next visit within 3 mo: Visit date not found  Next physical within 3 mo: Visit date not found  Prescriber OR PCP: Duke Olivas MD  Last diagnosis associated with med order: 1. Essential hypertension  - amLODIPine (NORVASC) 10 MG tablet; Take 1 tablet (10 mg total) by mouth daily.  Dispense: 90 tablet; Refill: 3    If protocol passes may refill for 12 months if within 3 months of last provider visit (or a total of 15 months).             Passed - Blood pressure filed in past 12 months     BP Readings from Last 1 Encounters:   09/03/20 120/80                 . Last office visit: 3/11/2019 Duke Olivas MD Last Physical: 4/10/2019 Last MTM visit: Visit date not found Last visit same specialty: 3/11/2019 Deisy  Duke MELENDEZ MD.  Next visit within 3 mo: Visit date not found  Next physical within 3 mo: Visit date not found      Warren ERINVarghese Fletcher, [facility] Triage/Med Refill 2/11/2021    Requested Prescriptions   Pending Prescriptions Disp Refills     amLODIPine (NORVASC) 10 MG tablet 30 tablet 0     Sig: Take 1 tablet (10 mg total) by mouth daily.       Calcium-Channel Blockers Protocol Failed - 2/10/2021  9:23 PM        Failed - PCP or prescribing provider visit in past 12 months or next 3 months     Last office visit with prescriber/PCP: 3/11/2019 Duke Olivas MD OR same dept: Visit date not found OR same specialty: 3/11/2019 Duke Olivas MD  Last physical: 4/10/2019 Last MTM visit: Visit date not found   Next visit within 3 mo: Visit date not found  Next physical within 3 mo: Visit date not found  Prescriber OR PCP: Duke Olivas MD  Last diagnosis associated with med order: 1. Essential hypertension  - amLODIPine (NORVASC) 10 MG tablet; Take 1 tablet (10 mg total) by mouth daily.  Dispense: 30 tablet; Refill: 0    If protocol passes may refill for 12 months if within 3 months of last provider visit (or a total of 15 months).             Passed - Blood pressure filed in past 12 months     BP Readings from Last 1 Encounters:   09/03/20 120/80

## 2021-06-15 NOTE — TELEPHONE ENCOUNTER
Reason for Call:  Medication or medication refill:    Do you use a [facility]?  Name of the pharmacy and phone number for the current request: [facility] [address]    Name of the medication requested:   amLODIPine (NORVASC) 10 MG tablet 30 tablet 0 3/10/2021  No   Sig: TAKE 1 TABLET BY MOUTH EVERY DAY. NEED TO ESTABLISH CARE WITH NEW MD          Other request: pt has appt with Dr Dooley on 3/17 and will be out before them,  Please refill.    He has out today (3/12)  Can we leave a detailed message on this number? Yes    Phone number patient can be reached at: Home number on file [phone] (home)    Best Time: any    Call taken on 3/12/2021 at 2:44 PM by Pamela Behr

## 2021-06-16 PROBLEM — M47.27 LUMBOSACRAL SPONDYLOSIS WITH RADICULOPATHY: Status: ACTIVE | Noted: 2021-03-17

## 2021-06-16 PROBLEM — R06.83 SNORING: Status: ACTIVE | Noted: 2021-03-17

## 2021-06-16 PROBLEM — E66.01 MORBID OBESITY (H): Status: ACTIVE | Noted: 2021-03-17

## 2021-06-16 PROBLEM — M48.062 SPINAL STENOSIS OF LUMBAR REGION WITH NEUROGENIC CLAUDICATION: Status: ACTIVE | Noted: 2021-03-17

## 2021-06-16 NOTE — PATIENT INSTRUCTIONS - HE
1. Lab testing today.     2. Follow up in the fall for annual wellness visit.     3. Consider sleep evaluation, and reflux treatment if throat symptoms increase.     4. Colonoscopy as planned.

## 2021-06-16 NOTE — PROGRESS NOTES
ASSESSMENT AND PLAN:    1. Essential hypertension  Satisfactory control   - amLODIPine (NORVASC) 10 MG tablet; TAKE 1 TABLET BY MOUTH EVERY DAY  Dispense: 90 tablet; Refill: 3  - lisinopriL (PRINIVIL,ZESTRIL) 20 MG tablet; Take 1 tablet (20 mg total) by mouth daily.  Dispense: 90 tablet; Refill: 3    2. Screen for colon cancer  Due and he has this scheduled.     3. Lumbosacral spondylosis with radiculopathy  Chronic, intermittent.  No weakness or sustained radiculopathy episodes.  Evaluation revealed HNP and spinal stenosis.  We discussed that weight loss would help.      4. Morbid obesity    We discussed a target weight of 240.     5. Possible GERD  Some throat congestion in the AM, and mildly raw throat.  Possibly GERD.  Will follow.  He declines empiric treatment for now. Urged to avoid eating prior to supper.     6. Possible JACOB  Known snorer.  No clear, severe daytime somnolence but symptoms are suggestive as is body habitus.  He may consider sleep evaluation in the future.     Patient Instructions   1. Lab testing today.     2. Follow up in the fall for annual wellness visit.     3. Consider sleep evaluation, and reflux treatment if throat symptoms increase.     4. Colonoscopy as planned.     CHIEF COMPLAINT:  Chief Complaint   Patient presents with     Establish Care     Change from Dr Olivas     HISTORY OF PRESENT ILLNESS:  Jan Campa is a 70 y.o. male here to establish care.  Doing OK.  Weight some gain recently.  Chronic low back pain and intermittently will have an element of radiculopathy.  Also, with walking he will have neurogenic claudication symptoms too, stable.  Had evaluation at [facility] in the past.   No changes in bowel function or bladder function.  His throat is congested in the AM, has to 'cough it out' in the AM.  Never smoked, no dysphagia, or daytime cough.  Some sense of daytime fatigue and mild somnolence, but no overt attacks of sleepiness.      REVIEW OF SYSTEMS:   See HPI, all other  "systems on review are negative.    Past Medical History:   Diagnosis Date     Hypertension      Lumbosacral spondylosis with radiculopathy      Ventral hernia      Social History     Tobacco Use   Smoking Status Never Smoker   Smokeless Tobacco Never Used     Family History   Problem Relation Age of Onset     Breast cancer Mother      Kidney cancer Father      Prostate cancer Father      Cancer Maternal Grandmother      Cancer Maternal Grandfather      Cancer Paternal Grandmother      Cancer Paternal Grandfather      Migraines Daughter      Past Surgical History:   Procedure Laterality Date     ABDOMINAL HERNIA REPAIR       VITALS:  Vitals:    03/17/21 1003   BP: 136/84   Patient Site: Left Arm   Patient Position: Sitting   Cuff Size: Adult Large   Pulse: 78   Resp: 18   SpO2: 98%   Weight: (!) 258 lb 9 oz (117.3 kg)   Height: 5' 11\" (1.803 m)     Wt Readings from Last 3 Encounters:   03/17/21 (!) 258 lb 9 oz (117.3 kg)   09/03/20 (!) 245 lb (111.1 kg)   05/09/19 (!) 251 lb 12.8 oz (114.2 kg)     PHYSICAL EXAM:  Constitutional:  In NAD, alert and oriented  HEENT - narrow posterior pharynx, midline uvula, negative for abnormality  Neck: no cervical or axillary adenopathy  Cardiac:  S1 S2   Lungs: Clear   Abdomen:   Soft, flat and non-tender, without guarding, rebound, or mass.    Extremities: no joint effusion, no significant edema  Neurologic:  Speech clear, no arm or leg weakness, gait normal     Psychiatric:  Mood and behavior appropriate, thinking is clear.     DECISION TO OBTAIN OLD RECORDS AND/OR OBTAIN HISTORY FROM SOMEONE OTHER THAN PATIENT, AND/OR ACCESSING CARE EVERYWHERE):  1  0     REVIEW AND SUMMARIZATION OF OLD RECORDS, AND/OR OBTAINING HISTORY FROM SOMEONE OTHER THAN PATIENT, AND/OR DISCUSSION OF CASE WITH ANOTHER HEALTH CARE PROVIDER:  2 reviewed past health evaluation    REVIEW AND/OR ORDER OF OF CLINICAL LAB TESTS: 1  Ordered. .    REVIEW AND/OR ORDER OF RADIOLOGY TESTS: 1 0.    REVIEW AND/OR ORDER " OF MEDICAL TESTS (EKG/ECHO/COLONOSCOPY/EGD): 1  0    INDEPENDENT  VISUALIZATION OF IMAGE, TRACING, OR SPECIMEN ITSELF (2 EACH):  0     TOTAL: 3    Current Outpatient Medications   Medication Sig Dispense Refill     amLODIPine (NORVASC) 10 MG tablet TAKE 1 TABLET BY MOUTH EVERY DAY 90 tablet 3     lisinopriL (PRINIVIL,ZESTRIL) 20 MG tablet Take 1 tablet (20 mg total) by mouth daily. 90 tablet 3     naproxen sodium (ALEVE) 220 MG tablet Take 220 mg by mouth every morning.              omega-3 fatty acids (FISH OIL CONCENTRATE ORAL) Take 3 capsules by mouth daily. [place] [employer] - suggested by eye doctor       jolene demarco/min oil/sod chl (DRY EYES OPHT) Administer 1 drop to both eyes daily.       Rohan Dooley MD  Internal Medicine  [facility]

## 2021-06-21 NOTE — LETTER
Letter by Rohan Dooley MD at      Author: Rohan Dooley MD Service: -- Author Type: --    Filed:  Encounter Date: 3/17/2021 Status: (Other)         Jan Campa  [address] [place] [address]     March 17, 2021     Dear Mr. Campa,    Below are the results from your recent visit:    Resulted Orders   Lipid Profile   Result Value Ref Range    Triglycerides 179 (H) <=149 mg/dL    Cholesterol 201 (H) <=199 mg/dL    LDL Calculated 128 <=129 mg/dL    HDL Cholesterol 37 (L) >=40 mg/dL    Patient Fasting > 8hrs? Yes    Comprehensive Metabolic Panel   Result Value Ref Range    Sodium 141 136 - 145 mmol/L    Potassium 5.0 3.5 - 5.0 mmol/L    Chloride 105 98 - 107 mmol/L    CO2 27 22 - 31 mmol/L    Anion Gap, Calculation 9 5 - 18 mmol/L    Glucose 105 70 - 125 mg/dL    BUN 17 8 - 22 mg/dL    Creatinine 0.82 0.70 - 1.30 mg/dL    GFR MDRD Af Amer >60 >60 mL/min/1.73m2    GFR MDRD Non Af Amer >60 >60 mL/min/1.73m2    Bilirubin, Total 0.6 0.0 - 1.0 mg/dL    Calcium 9.0 8.5 - 10.5 mg/dL    Protein, Total 6.7 6.0 - 8.0 g/dL    Albumin 3.8 3.5 - 5.0 g/dL    Alkaline Phosphatase 59 45 - 120 U/L    AST 18 0 - 40 U/L    ALT 17 0 - 45 U/L    Narrative    Fasting Glucose reference range is 70-99 mg/dL per  American Diabetes Association (ADA) guidelines.   PSA, Annual Screen (Prostatic-Specific Antigen)   Result Value Ref Range    PSA 0.5 0.0 - 4.5 ng/mL    Narrative    Method is Abbott Prostate-Specific Antigen (PSA)  Standard-WHO 1st International (90:10)   HM1 (CBC with Diff)   Result Value Ref Range    WBC 9.1 4.0 - 11.0 thou/uL    RBC 5.55 4.40 - 6.20 mill/uL    Hemoglobin 15.5 14.0 - 18.0 g/dL    Hematocrit 48.2 40.0 - 54.0 %    MCV 87 80 - 100 fL    MCH 27.9 27.0 - 34.0 pg    MCHC 32.2 32.0 - 36.0 g/dL    RDW 14.3 11.0 - 14.5 %    Platelets 255 140 - 440 thou/uL    MPV 9.4 7.0 - 10.0 fL    Neutrophils % 64 50 - 70 %    Lymphocytes % 25 20 - 40 %    Monocytes % 8 2 - 10 %    Eosinophils % 3 0 - 6 %    Basophils % 0 0  - 2 %    Immature Granulocyte % 0 <=0 %    Neutrophils Absolute 5.8 2.0 - 7.7 thou/uL    Lymphocytes Absolute 2.3 0.8 - 4.4 thou/uL    Monocytes Absolute 0.7 0.0 - 0.9 thou/uL    Eosinophils Absolute 0.3 0.0 - 0.4 thou/uL    Basophils Absolute 0.0 0.0 - 0.2 thou/uL    Immature Granulocyte Absolute 0.0 <=0.0 thou/uL       Your tests are all good.  The minor abnormalities are not significant.     Please call with questions or contact us using [facility].    Sincerely,        Electronically signed by Rohan Dooley MD

## 2021-06-24 NOTE — PROGRESS NOTES
Hernia education & surgery packet provided to pt.    Madhavi Pepe CMA ([facility])

## 2021-06-24 NOTE — TELEPHONE ENCOUNTER
Refill Request  Did you contact pharmacy: No  Medication name: lisinopril (PRINIVIL,ZESTRIL) 20 MG tablet and amLODIPine (NORVASC) 10 MG tablet  Who prescribed the medication: Dr. Melendez and Dr. Orona   [facility] Location[facility] [address]    Is patient out of medication: No.  5 days left  Patient notified refills processed in 72 hours:  yes  Okay to leave a detailed message: yes

## 2021-06-24 NOTE — PROGRESS NOTES
GENERAL SURGICAL CONSULTATION    I was requested by Duke Olivas MD to consult on this pt to evaluate them for ventral hernia    HPI:  This is a 68 y.o. male here today with a ventral hernia.  He has noticed a swelling in his supra-umbilical area for several years.  But over the last year and a half he has been having more discomfort associated with this hernia.  He is now getting more pain and occasional nausea and even vomiting.  He feels the swelling is certainly larger then it was a year ago.  He is able to reduce it manually.     Allergies:Patient has no known allergies.    Past Medical History:   Diagnosis Date     Hypertension        Past Surgical History:   Procedure Laterality Date     NO PAST SURGERIES         CURRENT MEDS:  Current Outpatient Medications   Medication Sig Dispense Refill     amLODIPine (NORVASC) 10 MG tablet Take 1 tablet (10 mg total) by mouth daily. 90 tablet 0     lisinopril (PRINIVIL,ZESTRIL) 20 MG tablet Take 1 tablet (20 mg total) by mouth daily. 90 tablet 1     naproxen sodium (ALEVE) 220 MG tablet Take 220 mg by mouth 2 (two) times a day with meals.       UNABLE TO FIND [facility] NaturalsMed Name:       traMADol (ULTRAM) 50 mg tablet TAKE 1 TABLET BY MOUTH THREE TIMES DAILY AS NEEDED 30 tablet 0     No current facility-administered medications for this visit.        Family History   Problem Relation Age of Onset     Breast cancer Mother      Kidney cancer Father      Prostate cancer Father      Cancer Maternal Grandmother      Cancer Maternal Grandfather      Cancer Paternal Grandmother      Cancer Paternal Grandfather      Family history is not pertinent to this patients Chief Complaint.     reports that  has never smoked. he has never used smokeless tobacco. He reports that he drinks alcohol. He reports that he does not use drugs.    Review of Systems -   10 point Review of systems is negative except for; as mentioned above in HPI and PMHx    /84 (Patient Site: Left Arm,  "Patient Position: Sitting, Cuff Size: Adult Large)   Pulse 86   Ht 5' 10\" (1.778 m)   Wt (!) 258 lb (117 kg)   SpO2 96%   BMI 37.02 kg/m    Body mass index is 37.02 kg/m .    EXAM:  GENERAL: Well developed male  HEENT: EOMI, Anicteric Sclera, Moist Mucous Membranes,  In Mouth the pt does not have redness or bleeding gums  CARDIOVASCULAR: RRR w/out murmur   CHEST/LUNG: Clear to Auscultation  ABDOMEN:  Non tender to palpation, +BS, prominent protrusion just above the umbilicus.  MUSCULOSKELETAL:  No deformities with good range of motion in all extremities  NEURO: He is ambulatory with good strength in both legs.  HEME/LYMPH: No Cervical Adenopathy or tenderness.     IMAGES:  none    Assessment/Plan:  68 year old man with a ventral hernia that look to contain small bowel.  He is symptomatic and the hernia is enlarging.  This hernia should be fixed soon.    Laparoscopic vs Robotic Ventral Hernia repair.      Jan Azevedo MD  [facility]  [phone]    "

## 2021-07-03 NOTE — ADDENDUM NOTE
Addendum Note by Elisabet Andino DPM at 9/3/2020  1:00 PM     Author: Elisabet Andino DPM Service: -- Author Type: Physician    Filed: 9/3/2020  1:16 PM Encounter Date: 9/3/2020 Status: Signed    : Elisabet Andino DPM (Physician)    Addended by: ELISABET ANDINO on: 9/3/2020 01:16 PM        Modules accepted: Orders

## 2021-07-03 NOTE — ADDENDUM NOTE
Addendum Note by Elisabet Andino DPM at 9/3/2020  1:00 PM     Author: Elisabet Andino DPM Service: -- Author Type: Physician    Filed: 9/3/2020  1:19 PM Encounter Date: 9/3/2020 Status: Signed    : Elisabet Andino DPM (Physician)    Addended by: ELISABET ANDINO on: 9/3/2020 01:19 PM        Modules accepted: Orders

## 2021-07-14 ENCOUNTER — TELEPHONE (OUTPATIENT)
Dept: INTERNAL MEDICINE | Facility: CLINIC | Age: 71
End: 2021-07-14

## 2021-07-14 DIAGNOSIS — I10 ESSENTIAL HYPERTENSION: ICD-10-CM

## 2021-07-14 NOTE — TELEPHONE ENCOUNTER
Reason for Call:  Other call back    Detailed comments: Checking status of refills on: Amlodipine  Lisinopril  Message was given that they were denied    Pharm:  [facility] [address]    Phone Number Patient can be reached at: Cell number on file:    Telephone Information:   Mobile [phone]       Best Time: anytime    Can we leave a detailed message on this number? YES    Call taken on 7/14/2021 at 3:25 PM by Shante Perez

## 2021-07-15 RX ORDER — LISINOPRIL 20 MG/1
20 TABLET ORAL DAILY
Qty: 90 TABLET | Refills: 0 | Status: SHIPPED | OUTPATIENT
Start: 2021-07-15 | End: 2021-10-23

## 2021-07-15 RX ORDER — AMLODIPINE BESYLATE 10 MG/1
TABLET ORAL
Qty: 90 TABLET | Refills: 0 | Status: SHIPPED | OUTPATIENT
Start: 2021-07-15 | End: 2021-10-23

## 2021-09-25 ENCOUNTER — HEALTH MAINTENANCE LETTER (OUTPATIENT)
Age: 71
End: 2021-09-25

## 2021-10-23 ENCOUNTER — MYC REFILL (OUTPATIENT)
Dept: INTERNAL MEDICINE | Facility: CLINIC | Age: 71
End: 2021-10-23

## 2021-10-23 DIAGNOSIS — I10 ESSENTIAL HYPERTENSION: ICD-10-CM

## 2021-10-25 RX ORDER — AMLODIPINE BESYLATE 10 MG/1
TABLET ORAL
Qty: 90 TABLET | Refills: 0 | Status: SHIPPED | OUTPATIENT
Start: 2021-10-25 | End: 2022-03-16

## 2021-10-25 RX ORDER — LISINOPRIL 20 MG/1
20 TABLET ORAL DAILY
Qty: 90 TABLET | Refills: 0 | Status: SHIPPED | OUTPATIENT
Start: 2021-10-25 | End: 2022-03-16

## 2021-10-25 NOTE — TELEPHONE ENCOUNTER
"Former patient of De[facility] & has not established care with another provider.  Please assign refill request to covering provider per clinic standard process.    Routing refill request to provider for review/approval because:  No pcp    Last Written Prescription Date:  7/15/21  Last Fill Quantity: 90,  # refills: 0   Last office visit provider:  3/17/21     Requested Prescriptions   Pending Prescriptions Disp Refills     amLODIPine (NORVASC) 10 MG tablet 90 tablet 0     Sig: [AMLODIPINE (NORVASC) 10 MG TABLET] TAKE 1 TABLET BY MOUTH EVERY DAY       Calcium Channel Blockers Protocol  Passed - 10/23/2021  8:15 AM        Passed - Blood pressure under 140/90 in past 12 months     BP Readings from Last 3 Encounters:   03/17/21 136/84   09/03/20 120/80                 Passed - Recent (12 mo) or future (30 days) visit within the authorizing provider's specialty     Patient has had an office visit with the authorizing provider or a provider within the authorizing providers department within the previous 12 mos or has a future within next 30 days. See \"Patient Info\" tab in inbasket, or \"Choose Columns\" in Meds & Orders section of the refill encounter.              Passed - Medication is active on med list        Passed - Patient is age 18 or older        Passed - Normal serum creatinine on file in past 12 months     Recent Labs   Lab Test 03/17/21  1105   CR 0.82       Ok to refill medication if creatinine is low               Warren Fletcher RN 10/25/21 8:08 AM  "

## 2021-10-25 NOTE — TELEPHONE ENCOUNTER
"Former patient of Deisy & has not established care with another provider.  Please assign refill request to covering provider per clinic standard process.    Routing refill request to provider for review/approval because:  No pcp    Last Written Prescription Date:  7/15/21  Last Fill Quantity: 90,  # refills: 0   Last office visit provider:  3/17/21     Requested Prescriptions   Pending Prescriptions Disp Refills     lisinopril (ZESTRIL) 20 MG tablet 90 tablet 0     Sig: Take 1 tablet (20 mg) by mouth daily       ACE Inhibitors (Including Combos) Protocol Passed - 10/23/2021  8:15 AM        Passed - Blood pressure under 140/90 in past 12 months     BP Readings from Last 3 Encounters:   03/17/21 136/84   09/03/20 120/80                 Passed - Recent (12 mo) or future (30 days) visit within the authorizing provider's specialty     Patient has had an office visit with the authorizing provider or a provider within the authorizing providers department within the previous 12 mos or has a future within next 30 days. See \"Patient Info\" tab in inbasket, or \"Choose Columns\" in Meds & Orders section of the refill encounter.              Passed - Medication is active on med list        Passed - Patient is age 18 or older        Passed - Normal serum creatinine on file in past 12 months     Recent Labs   Lab Test 03/17/21  1105   CR 0.82       Ok to refill medication if creatinine is low          Passed - Normal serum potassium on file in past 12 months     Recent Labs   Lab Test 03/17/21  1105   POTASSIUM 5.0                  Warren Fletcher RN 10/25/21 8:09 AM  "

## 2021-11-10 ENCOUNTER — IMMUNIZATION (OUTPATIENT)
Dept: NURSING | Facility: CLINIC | Age: 71
End: 2021-11-10
Payer: MEDICARE

## 2021-11-10 PROCEDURE — 0064A COVID-19,PF,MODERNA (18+ YRS BOOSTER .25ML): CPT

## 2021-11-10 PROCEDURE — 91306 COVID-19,PF,MODERNA (18+ YRS BOOSTER .25ML): CPT

## 2022-06-02 DIAGNOSIS — I10 ESSENTIAL HYPERTENSION: ICD-10-CM

## 2022-06-04 NOTE — TELEPHONE ENCOUNTER
"Routing refill request to provider for review/approval because:  Labs not current:      Last Written Prescription Date:  3/16/22  Last Fill Quantity: 90,  # refills: 0   Last office visit provider:  3/17/21      Requested Prescriptions   Pending Prescriptions Disp Refills     lisinopril (ZESTRIL) 20 MG tablet [Pharmacy Med Name: LISINOPRIL 20 MG TABLET] 90 tablet 0     Sig: TAKE 1 TABLET BY MOUTH EVERY DAY       ACE Inhibitors (Including Combos) Protocol Failed - 6/2/2022  8:51 PM        Failed - Blood pressure under 140/90 in past 12 months     BP Readings from Last 3 Encounters:   03/17/21 136/84   09/03/20 120/80                 Failed - Recent (12 mo) or future (30 days) visit within the authorizing provider's specialty     Patient has had an office visit with the authorizing provider or a provider within the authorizing providers department within the previous 12 mos or has a future within next 30 days. See \"Patient Info\" tab in inbasket, or \"Choose Columns\" in Meds & Orders section of the refill encounter.              Failed - Normal serum creatinine on file in past 12 months     Recent Labs   Lab Test 03/17/21  1105   CR 0.82       Ok to refill medication if creatinine is low          Failed - Normal serum potassium on file in past 12 months     Recent Labs   Lab Test 03/17/21  1105   POTASSIUM 5.0             Passed - Medication is active on med list        Passed - Patient is age 18 or older           amLODIPine (NORVASC) 10 MG tablet [Pharmacy Med Name: AMLODIPINE BESYLATE 10 MG TAB] 90 tablet 0     Sig: TAKE 1 TABLET BY MOUTH EVERY DAY       Calcium Channel Blockers Protocol  Failed - 6/2/2022  8:51 PM        Failed - Blood pressure under 140/90 in past 12 months     BP Readings from Last 3 Encounters:   03/17/21 136/84   09/03/20 120/80                 Failed - Recent (12 mo) or future (30 days) visit within the authorizing provider's specialty     Patient has had an office visit with the authorizing " "provider or a provider within the authorizing providers department within the previous 12 mos or has a future within next 30 days. See \"Patient Info\" tab in inbasket, or \"Choose Columns\" in Meds & Orders section of the refill encounter.              Failed - Normal serum creatinine on file in past 12 months     Recent Labs   Lab Test 03/17/21  1105   CR 0.82       Ok to refill medication if creatinine is low          Passed - Medication is active on med list        Passed - Patient is age 18 or older             Angela Olmstead, RN 06/04/22 12:32 PM  "

## 2022-06-13 RX ORDER — AMLODIPINE BESYLATE 10 MG/1
TABLET ORAL
Qty: 90 TABLET | Refills: 0 | Status: SHIPPED | OUTPATIENT
Start: 2022-06-13 | End: 2022-09-08

## 2022-06-13 RX ORDER — LISINOPRIL 20 MG/1
TABLET ORAL
Qty: 90 TABLET | Refills: 0 | Status: SHIPPED | OUTPATIENT
Start: 2022-06-13 | End: 2022-09-19

## 2022-07-02 ENCOUNTER — HEALTH MAINTENANCE LETTER (OUTPATIENT)
Age: 72
End: 2022-07-02

## 2022-09-02 ENCOUNTER — NURSE TRIAGE (OUTPATIENT)
Dept: NURSING | Facility: CLINIC | Age: 72
End: 2022-09-02

## 2022-09-02 NOTE — TELEPHONE ENCOUNTER
Pt calling with     Bilateral LE edema from feet to knees X 2 weeks    Has been elevating LE's and has been using compression hose for last 2 days.    Pt Denies;  Chest pain   Difficulty breathing/SOB  Unable to walk  Unable to put his shoes on  Swelling that extends to upper legs or other areas of his body    According to the protocol, patient should see a physician or HCP within 3 days.  Care advice given. Patient verbalizes understanding and states he does not feel this is an emergency and would rather be seen in clinic as opposed to ED/UCC - but Pt acknowledges that care advise is to be seen within 3 days.  Transferred to scheduling    Winnie Beauchamp RN, Nurse Advisor 10:36 PM 9/2/2022  COVID 19 Nurse Triage Plan/Patient Instructions    Please be aware that novel coronavirus (COVID-19) may be circulating in the community. If you develop symptoms such as fever, cough, or SOB or if you have concerns about the presence of another infection including coronavirus (COVID-19), please contact your health care provider or visit [email or web address].     Disposition/Instructions    In-Person Visit with provider recommended. Reference Visit Selection Guide.    Thank you for taking steps to prevent the spread of this virus.  o Limit your contact with others.  o Wear a simple mask to cover your cough.  o Wash your hands well and often.    Reason for Disposition    [1] MILD swelling of both ankles (i.e., pedal edema) AND [2] new-onset or worsening    Additional Information    Negative: SEVERE difficulty breathing (e.g., struggling for each breath, speaks in single words)    Negative: Looks like a broken bone or dislocated joint (e.g., crooked or deformed)    Negative: Sounds like a life-threatening emergency to the triager    Negative: Chest pain    Negative: Followed a leg injury    Negative: [1] Small area of swelling AND [2] followed an insect bite to the area    Negative: Swelling of one ankle joint    Negative:  Swelling of knee is main symptom    Negative: Pregnant    Negative: Postpartum (from 0 to 6 weeks after delivery)    Negative: [1] Heart failure suspected (e.g., ankle swelling, shortness of breath, weight gain) AND [2] recently in hospital for heart failure    Negative: SEVERE leg swelling (e.g., swelling extends above knee, entire leg is swollen, weeping fluid)    Negative: [1] Red area or streak [2] large (> 2 in. or 5 cm)    Negative: [1] Thigh or calf pain AND [2] only 1 side AND [3] present > 1 hour    Negative: [1] Thigh, calf, or ankle swelling AND [2] only 1 side    Negative: [1] Thigh, calf, or ankle swelling AND [2] bilateral AND [3] 1 side is more swollen    Negative: [1] Difficulty breathing with exertion (e.g., walking) AND [2] new-onset or worsening    Negative: [1] Red area or streak AND [2] fever    Negative: [1] Swelling is painful to touch AND [2] fever    Negative: [1] Cast on leg or ankle AND [2] now increased pain    Negative: Patient sounds very sick or weak to the triager    Negative: [1] MODERATE leg swelling (e.g., swelling extends up to knees) AND [2] new-onset or worsening    Negative: Swelling of face, arm or hands  (Exception: slight puffiness of fingers occurring during hot weather)    Negative: Looks like a boil, infected sore, deep ulcer or other infected rash (spreading redness, pus)    Protocols used: LEG SWELLING AND EDEMA-A-AH

## 2022-09-08 ENCOUNTER — OFFICE VISIT (OUTPATIENT)
Dept: INTERNAL MEDICINE | Facility: CLINIC | Age: 72
End: 2022-09-08
Payer: MEDICARE

## 2022-09-08 VITALS
OXYGEN SATURATION: 96 % | WEIGHT: 269 LBS | TEMPERATURE: 98 F | DIASTOLIC BLOOD PRESSURE: 82 MMHG | HEIGHT: 71 IN | BODY MASS INDEX: 37.66 KG/M2 | SYSTOLIC BLOOD PRESSURE: 145 MMHG | HEART RATE: 94 BPM

## 2022-09-08 DIAGNOSIS — I10 ESSENTIAL HYPERTENSION: ICD-10-CM

## 2022-09-08 DIAGNOSIS — E66.01 MORBID OBESITY (H): ICD-10-CM

## 2022-09-08 DIAGNOSIS — Z79.899 HIGH RISK MEDICATION USE: ICD-10-CM

## 2022-09-08 DIAGNOSIS — B35.3 TINEA PEDIS OF BOTH FEET: ICD-10-CM

## 2022-09-08 DIAGNOSIS — R60.9 EDEMA, UNSPECIFIED TYPE: Primary | ICD-10-CM

## 2022-09-08 LAB
ALBUMIN SERPL BCG-MCNC: 4.1 G/DL (ref 3.5–5.2)
ALBUMIN UR-MCNC: NEGATIVE MG/DL
ALP SERPL-CCNC: 54 U/L (ref 40–129)
ALT SERPL W P-5'-P-CCNC: 16 U/L (ref 10–50)
ANION GAP SERPL CALCULATED.3IONS-SCNC: 9 MMOL/L (ref 7–15)
APPEARANCE UR: CLEAR
AST SERPL W P-5'-P-CCNC: 27 U/L (ref 10–50)
ATRIAL RATE - MUSE: 88 BPM
BILIRUB SERPL-MCNC: 0.6 MG/DL
BILIRUB UR QL STRIP: NEGATIVE
BUN SERPL-MCNC: 15.5 MG/DL (ref 8–23)
CALCIUM SERPL-MCNC: 9.1 MG/DL (ref 8.8–10.2)
CHLORIDE SERPL-SCNC: 105 MMOL/L (ref 98–107)
COLOR UR AUTO: YELLOW
CREAT SERPL-MCNC: 0.93 MG/DL (ref 0.67–1.17)
DEPRECATED HCO3 PLAS-SCNC: 26 MMOL/L (ref 22–29)
DIASTOLIC BLOOD PRESSURE - MUSE: NORMAL MMHG
GFR SERPL CREATININE-BSD FRML MDRD: 88 ML/MIN/1.73M2
GLUCOSE SERPL-MCNC: 113 MG/DL (ref 70–99)
GLUCOSE UR STRIP-MCNC: NEGATIVE MG/DL
HGB UR QL STRIP: NEGATIVE
INTERPRETATION ECG - MUSE: NORMAL
KETONES UR STRIP-MCNC: NEGATIVE MG/DL
LEUKOCYTE ESTERASE UR QL STRIP: NEGATIVE
NITRATE UR QL: NEGATIVE
P AXIS - MUSE: 65 DEGREES
PH UR STRIP: 6.5 [PH] (ref 5–8)
POTASSIUM SERPL-SCNC: 5 MMOL/L (ref 3.4–5.3)
PR INTERVAL - MUSE: 152 MS
PROT SERPL-MCNC: 7 G/DL (ref 6.4–8.3)
QRS DURATION - MUSE: 98 MS
QT - MUSE: 348 MS
QTC - MUSE: 421 MS
R AXIS - MUSE: 38 DEGREES
SODIUM SERPL-SCNC: 140 MMOL/L (ref 136–145)
SP GR UR STRIP: 1.02 (ref 1–1.03)
SYSTOLIC BLOOD PRESSURE - MUSE: NORMAL MMHG
T AXIS - MUSE: 41 DEGREES
UROBILINOGEN UR STRIP-ACNC: 1 E.U./DL
VENTRICULAR RATE- MUSE: 88 BPM

## 2022-09-08 PROCEDURE — 99214 OFFICE O/P EST MOD 30 MIN: CPT | Performed by: INTERNAL MEDICINE

## 2022-09-08 PROCEDURE — 81003 URINALYSIS AUTO W/O SCOPE: CPT | Performed by: INTERNAL MEDICINE

## 2022-09-08 PROCEDURE — 80053 COMPREHEN METABOLIC PANEL: CPT | Performed by: INTERNAL MEDICINE

## 2022-09-08 PROCEDURE — 93010 ELECTROCARDIOGRAM REPORT: CPT | Mod: OFF | Performed by: INTERNAL MEDICINE

## 2022-09-08 PROCEDURE — 36415 COLL VENOUS BLD VENIPUNCTURE: CPT | Performed by: INTERNAL MEDICINE

## 2022-09-08 PROCEDURE — 93005 ELECTROCARDIOGRAM TRACING: CPT | Performed by: INTERNAL MEDICINE

## 2022-09-08 RX ORDER — AMLODIPINE BESYLATE 5 MG/1
5 TABLET ORAL DAILY
Qty: 90 TABLET | Refills: 0 | Status: SHIPPED | OUTPATIENT
Start: 2022-09-08 | End: 2022-11-03

## 2022-09-08 RX ORDER — HYDROCHLOROTHIAZIDE 12.5 MG/1
12.5 TABLET ORAL DAILY
Qty: 30 TABLET | Refills: 0 | Status: SHIPPED | OUTPATIENT
Start: 2022-09-08 | End: 2022-10-03

## 2022-09-08 NOTE — PROGRESS NOTES
1. Edema, unspecified type  EKG unchanged.  Likely multifactorial.  Medications.  Salt load.  Venous insufficiency.  NSAID use.  Discontinue naproxen.  Cut back amlodipine to 5 mg daily.  Add hydrochlorothiazide for blood pressure.  See Dr. Isidro in 1 week.  - EKG 12-lead, tracing only  - US Lower Extremity Venous Duplex Bilateral; Future  - Echocardiogram Complete; Future  - UA Macro with Reflex to Micro and Culture - lab collect; Future    2. Essential hypertension  As above we will cut back the amlodipine and add low-dose hydrochlorothiazide.  Have room to go up on the ACE as well  - Comprehensive metabolic panel (BMP + Alb, Alk Phos, ALT, AST, Total. Bili, TP); Future  - UA Macro with Reflex to Micro and Culture - lab collect; Future  - amLODIPine (NORVASC) 5 MG tablet; Take 1 tablet (5 mg) by mouth daily  Dispense: 90 tablet; Refill: 0  - hydrochlorothiazide (HYDRODIURIL) 12.5 MG tablet; Take 1 tablet (12.5 mg) by mouth daily  Dispense: 30 tablet; Refill: 0    3. Obesity (BMI 35.0-39.9) with comorbidity (H)  Salt restriction and weight loss urged.  He also may have sleep apnea.  Strongly recommend sleep study especially if evidence of right heart failure or pulmonary hypertension on echo.    4. High risk medication use  Discontinue naproxen.    5. Tinea pedis of both feet  He shows me his feet with a scaly rash consistent with tinea pedis I recommended over-the-counter athlete's foot cream    Subjective   Erik is a 72 year old, presenting for the following health issues:  Edema (Increased lower leg swelling (bilateral legs) x 1-2 weeks )      History of Present Illness       Reason for visit:  Concern about fluid retention in feet and legs  Symptom onset:  More than a month  Symptoms include:  Fluid retention in feet and legs  Symptom intensity:  Severe  Symptom progression:  Staying the same  Had these symptoms before:  No  What makes it worse:  No  What makes it better:  Elevating feet above body    He  "eats 4 or more servings of fruits and vegetables daily.He consumes 0 sweetened beverage(s) daily.He exercises with enough effort to increase his heart rate 10 to 19 minutes per day.  He exercises with enough effort to increase his heart rate 4 days per week.   He is taking medications regularly.         Patient of Dr. Isidro's comes in as an urgent add-on for evaluation of 2 months of swollen legs.  He called about a week ago and they put him in my schedule.  He noted it in July.  He was eating a large amount of salt.  He also takes naproxen and high-dose amlodipine.  No chest pains.  No shortness of breath.  No other new medications.  He elevated his legs a few days ago and he had marked improvement of his swelling he tells me.  There has not been any leg pain.  No open areas.  No weeping.  No frothiness to the urine.  No change in his urinary symptoms or habits.    Review of Systems         Objective    BP (!) 145/82 (BP Location: Left arm, Patient Position: Sitting, Cuff Size: Adult Large)   Pulse 94   Temp 98  F (36.7  C)   Ht 1.803 m (5' 11\")   Wt 122 kg (269 lb)   SpO2 96%   BMI 37.52 kg/m    Body mass index is 37.52 kg/m .  Physical Exam   Obese gentleman.  Heart regular.  2+ edema at the ankles only.  No erythema warmth or weeping                    "

## 2022-09-15 ENCOUNTER — HOSPITAL ENCOUNTER (OUTPATIENT)
Dept: ULTRASOUND IMAGING | Facility: HOSPITAL | Age: 72
Discharge: HOME | End: 2022-09-15
Attending: INTERNAL MEDICINE | Admitting: INTERNAL MEDICINE
Payer: MEDICARE

## 2022-09-15 DIAGNOSIS — R60.9 EDEMA, UNSPECIFIED TYPE: ICD-10-CM

## 2022-09-15 PROCEDURE — 93970 EXTREMITY STUDY: CPT

## 2022-09-19 DIAGNOSIS — I10 ESSENTIAL HYPERTENSION: ICD-10-CM

## 2022-09-21 RX ORDER — LISINOPRIL 20 MG/1
20 TABLET ORAL DAILY
Qty: 90 TABLET | Refills: 3 | Status: SHIPPED | OUTPATIENT
Start: 2022-09-21 | End: 2022-11-03

## 2022-09-21 NOTE — TELEPHONE ENCOUNTER
"Routing refill request to provider for review/approval because:  BP not in range.    Last Written Prescription Date:  6/13/22  Last Fill Quantity: 90,  # refills: 0   Last office visit provider:  9/8/22     Requested Prescriptions   Pending Prescriptions Disp Refills     lisinopril (ZESTRIL) 20 MG tablet 90 tablet 0     Sig: Take 1 tablet (20 mg) by mouth daily       ACE Inhibitors (Including Combos) Protocol Failed - 9/21/2022  9:14 AM        Failed - Blood pressure under 140/90 in past 12 months     BP Readings from Last 3 Encounters:   09/08/22 (!) 145/82   03/17/21 136/84   09/03/20 120/80                 Passed - Recent (12 mo) or future (30 days) visit within the authorizing provider's specialty     Patient has had an office visit with the authorizing provider or a provider within the authorizing providers department within the previous 12 mos or has a future within next 30 days. See \"Patient Info\" tab in inbasket, or \"Choose Columns\" in Meds & Orders section of the refill encounter.              Passed - Medication is active on med list        Passed - Patient is age 18 or older        Passed - Normal serum creatinine on file in past 12 months     Recent Labs   Lab Test 09/08/22  1042   CR 0.93       Ok to refill medication if creatinine is low          Passed - Normal serum potassium on file in past 12 months     Recent Labs   Lab Test 09/08/22  1042   POTASSIUM 5.0                  Warren Fletcher RN 09/21/22 9:14 AM  "

## 2022-09-27 ENCOUNTER — HOSPITAL ENCOUNTER (OUTPATIENT)
Dept: CARDIOLOGY | Facility: HOSPITAL | Age: 72
Discharge: HOME | End: 2022-09-27
Attending: INTERNAL MEDICINE | Admitting: INTERNAL MEDICINE
Payer: MEDICARE

## 2022-09-27 DIAGNOSIS — R60.9 EDEMA, UNSPECIFIED TYPE: ICD-10-CM

## 2022-09-27 LAB — LVEF ECHO: NORMAL

## 2022-09-27 PROCEDURE — 93306 TTE W/DOPPLER COMPLETE: CPT | Mod: 26 | Performed by: INTERNAL MEDICINE

## 2022-09-27 PROCEDURE — 255N000002 HC RX 255 OP 636: Performed by: INTERNAL MEDICINE

## 2022-09-27 RX ADMIN — PERFLUTREN 2 ML: 6.52 INJECTION, SUSPENSION INTRAVENOUS at 14:30

## 2022-09-28 ENCOUNTER — TELEPHONE (OUTPATIENT)
Dept: INTERNAL MEDICINE | Facility: CLINIC | Age: 72
End: 2022-09-28

## 2022-09-28 NOTE — TELEPHONE ENCOUNTER
Spoke with patient and relayed information below from Dr Matthews. Patient verbalized understanding and has no further questions.

## 2022-09-28 NOTE — TELEPHONE ENCOUNTER
----- Message from Cole Matthews MD sent at 9/27/2022  6:25 PM CDT -----  Team - please call patient with results.    Heart pumping function appears normal.  Aorta is little enlarged, so Dr. Dooley will likely want to address this when he sees you.

## 2022-10-01 DIAGNOSIS — I10 ESSENTIAL HYPERTENSION: ICD-10-CM

## 2022-10-02 NOTE — TELEPHONE ENCOUNTER
"Routing refill request to provider for review/approval because:  bp out of range    Last Written Prescription Date:  9/8/22  Last Fill Quantity: 30,  # refills: 0   Last office visit provider:  9/8/22     Requested Prescriptions   Pending Prescriptions Disp Refills     hydrochlorothiazide (HYDRODIURIL) 12.5 MG tablet [Pharmacy Med Name: HYDROCHLOROTHIAZIDE 12.5 MG TB] 30 tablet 0     Sig: TAKE 1 TABLET BY MOUTH EVERY DAY       Diuretics (Including Combos) Protocol Failed - 10/1/2022  7:42 AM        Failed - Blood pressure under 140/90 in past 12 months     BP Readings from Last 3 Encounters:   09/08/22 (!) 145/82   03/17/21 136/84   09/03/20 120/80                 Passed - Recent (12 mo) or future (30 days) visit within the authorizing provider's specialty     Patient has had an office visit with the authorizing provider or a provider within the authorizing providers department within the previous 12 mos or has a future within next 30 days. See \"Patient Info\" tab in inbasket, or \"Choose Columns\" in Meds & Orders section of the refill encounter.              Passed - Medication is active on med list        Passed - Patient is age 18 or older        Passed - Normal serum creatinine on file in past 12 months     Recent Labs   Lab Test 09/08/22  1042   CR 0.93              Passed - Normal serum potassium on file in past 12 months     Recent Labs   Lab Test 09/08/22  1042   POTASSIUM 5.0                    Passed - Normal serum sodium on file in past 12 months     Recent Labs   Lab Test 09/08/22  1042                      Angela Olmstead RN 10/01/22 8:28 PM  "

## 2022-10-03 RX ORDER — HYDROCHLOROTHIAZIDE 12.5 MG/1
TABLET ORAL
Qty: 30 TABLET | Refills: 0 | Status: SHIPPED | OUTPATIENT
Start: 2022-10-03 | End: 2022-10-28

## 2022-10-05 ENCOUNTER — OFFICE VISIT (OUTPATIENT)
Dept: INTERNAL MEDICINE | Facility: CLINIC | Age: 72
End: 2022-10-05
Payer: MEDICARE

## 2022-10-05 VITALS
WEIGHT: 261.2 LBS | TEMPERATURE: 98.1 F | OXYGEN SATURATION: 96 % | BODY MASS INDEX: 36.57 KG/M2 | HEIGHT: 71 IN | SYSTOLIC BLOOD PRESSURE: 152 MMHG | HEART RATE: 77 BPM | DIASTOLIC BLOOD PRESSURE: 91 MMHG

## 2022-10-05 DIAGNOSIS — E78.5 HYPERLIPIDEMIA LDL GOAL <130: ICD-10-CM

## 2022-10-05 DIAGNOSIS — J34.89 NASAL OBSTRUCTION: ICD-10-CM

## 2022-10-05 DIAGNOSIS — Z13.220 SCREENING FOR HYPERLIPIDEMIA: ICD-10-CM

## 2022-10-05 DIAGNOSIS — I10 ESSENTIAL HYPERTENSION: ICD-10-CM

## 2022-10-05 DIAGNOSIS — I87.2 CHRONIC VENOUS INSUFFICIENCY: ICD-10-CM

## 2022-10-05 DIAGNOSIS — R06.83 SNORING: ICD-10-CM

## 2022-10-05 DIAGNOSIS — E66.01 MORBID OBESITY (H): ICD-10-CM

## 2022-10-05 DIAGNOSIS — Z12.5 SCREENING FOR PROSTATE CANCER: Primary | ICD-10-CM

## 2022-10-05 DIAGNOSIS — Z12.11 SCREENING FOR COLON CANCER: ICD-10-CM

## 2022-10-05 PROBLEM — E66.09 CLASS 2 OBESITY DUE TO EXCESS CALORIES WITHOUT SERIOUS COMORBIDITY IN ADULT: Status: ACTIVE | Noted: 2021-03-17

## 2022-10-05 PROBLEM — M21.41 PES PLANUS OF BOTH FEET: Status: ACTIVE | Noted: 2022-10-05

## 2022-10-05 PROBLEM — E66.812 CLASS 2 OBESITY DUE TO EXCESS CALORIES WITHOUT SERIOUS COMORBIDITY IN ADULT: Status: ACTIVE | Noted: 2021-03-17

## 2022-10-05 PROBLEM — M21.42 PES PLANUS OF BOTH FEET: Status: ACTIVE | Noted: 2022-10-05

## 2022-10-05 LAB
CHOLEST SERPL-MCNC: 206 MG/DL
ERYTHROCYTE [DISTWIDTH] IN BLOOD BY AUTOMATED COUNT: 13.7 % (ref 10–15)
HCT VFR BLD AUTO: 47.8 % (ref 40–53)
HDLC SERPL-MCNC: 36 MG/DL
HGB BLD-MCNC: 15.6 G/DL (ref 13.3–17.7)
LDLC SERPL CALC-MCNC: 140 MG/DL
MCH RBC QN AUTO: 27.9 PG (ref 26.5–33)
MCHC RBC AUTO-ENTMCNC: 32.6 G/DL (ref 31.5–36.5)
MCV RBC AUTO: 86 FL (ref 78–100)
NONHDLC SERPL-MCNC: 170 MG/DL
PLATELET # BLD AUTO: 255 10E3/UL (ref 150–450)
PSA SERPL-MCNC: 0.66 NG/ML (ref 0–6.5)
RBC # BLD AUTO: 5.59 10E6/UL (ref 4.4–5.9)
TRIGL SERPL-MCNC: 150 MG/DL
WBC # BLD AUTO: 7.4 10E3/UL (ref 4–11)

## 2022-10-05 PROCEDURE — 85027 COMPLETE CBC AUTOMATED: CPT | Performed by: INTERNAL MEDICINE

## 2022-10-05 PROCEDURE — G0103 PSA SCREENING: HCPCS | Performed by: INTERNAL MEDICINE

## 2022-10-05 PROCEDURE — 80061 LIPID PANEL: CPT | Performed by: INTERNAL MEDICINE

## 2022-10-05 PROCEDURE — G0438 PPPS, INITIAL VISIT: HCPCS | Performed by: INTERNAL MEDICINE

## 2022-10-05 PROCEDURE — 36415 COLL VENOUS BLD VENIPUNCTURE: CPT | Performed by: INTERNAL MEDICINE

## 2022-10-05 RX ORDER — LISINOPRIL 30 MG/1
30 TABLET ORAL DAILY
Qty: 90 TABLET | Refills: 3 | Status: SHIPPED | OUTPATIENT
Start: 2022-10-05 | End: 2023-09-11

## 2022-10-05 NOTE — PROGRESS NOTES
SUBJECTIVE:   Erik is a 72 year old who presents for Preventive Visit.    HPI:  He feels well overall.  His LE edema has improved.  No unusual dyspnea or cough.  Continues to snore, but does not feel daytime somnolence.  No chest pain or palpitations.  Weight loss has not occurred.  He does report chronic sense of nose obstruction. Doesn't have overt allergic rhinitis.  Voiding OK. Due for repeat colonoscopy has had sessile polyp one year ago, and sub optimal prep.    {  Are you in the first 12 months of your Medicare coverage?  No    HPI  Do you feel safe in your environment? Yes  Cognitive Screening: cognitive function is intact today.     Do you have sleep apnea, excessive snoring or daytime drowsiness?: see above, possibly.     Reviewed and updated as needed this visit by clinical staff   Tobacco   Meds  Problems           Reviewed and updated as needed this visit by Provider      Problems           Social History     Tobacco Use     Smoking status: Never Smoker     Smokeless tobacco: Never Used   Substance Use Topics     Alcohol use: Yes     Comment: Alcoholic Drinks/day: 2-3 beers weekly     Current providers sharing in care for this patient include:   Patient Care Team:  Rohan Dooley MD as PCP - General (Internal Medicine)    The following health maintenance items are reviewed in Epic and correct as of today:  Health Maintenance   Topic Date Due     ANNUAL REVIEW OF HM ORDERS  Never done     ADVANCE CARE PLANNING  Never done     HEPATITIS C SCREENING  Never done     MEDICARE ANNUAL WELLNESS VISIT  08/09/2017     AORTIC ANEURYSM SCREENING (SYSTEM ASSIGNED)  08/09/2017     COVID-19 Vaccine (4 - Booster for Moderna series) 01/05/2022     INFLUENZA VACCINE (1) 09/01/2022     DTAP/TDAP/TD IMMUNIZATION (2 - Td or Tdap) 05/20/2023     FALL RISK ASSESSMENT  09/08/2023     LIPID  03/17/2026     COLORECTAL CANCER SCREENING  04/26/2031     PHQ-2 (once per calendar year)  Completed     Pneumococcal Vaccine: 65+  "Years  Completed     ZOSTER IMMUNIZATION  Completed     IPV IMMUNIZATION  Aged Out     MENINGITIS IMMUNIZATION  Aged Out     HEPATITIS B IMMUNIZATION  Aged Out     Review of Systems   See HPI    OBJECTIVE:     PHYSICAL EXAM:  General Appearance: In no acute distress  BP (!) 152/91 (BP Location: Left arm, Patient Position: Sitting, Cuff Size: Adult Large)   Pulse 77   Temp 98.1  F (36.7  C) (Tympanic)   Ht 1.8 m (5' 10.87\")   Wt 118.5 kg (261 lb 3.2 oz)   SpO2 96%   BMI 36.57 kg/m    HEENT: nose and throat clear, ears normal, no indication of obstruction in nares.     NECK:  without cervical or axillary adenopathy  RESPIRATORY: Clear   CARDIOVASCULAR: S1, S2  ABDOMEN: soft, flat, and non-tender, without mass, rebound, or guarding  EXTREMITIES: No joint swelling, no ulcer or edema  NEUROLOGIC: No focal arm or leg  weakness, speech is clear, gait is normal    ASSESSMENT / PLAN:     Jan was seen today for recheck medication and recheck.    Diagnoses and all orders for this visit:    Screening for prostate cancer  -     PSA, screen    Chronic venous insufficiency  Obesity plays a role, as does amlodipine.  Recent echocardiogram was normal.      Essential hypertension  Will increase lisinopril, and stop amlodipine altogether.    -     lisinopril (ZESTRIL) 30 MG tablet; Take 1 tablet (30 mg) by mouth daily  -     CBC with platelets    Screening for hyperlipidemia  -     Lipid panel reflex to direct LDL Fasting    Nasal obstruction  Ask ENT to evaluate for mechanical or polypoid obstruction.    -     CT Sinus w/o Contrast  -     Adult ENT  Referral    Snoring  He agrees to sleep clinic evaluation  -     Adult Sleep Eval & Management  Referral; Future    Screening for colon cancer  -     Colonoscopy Screening  Referral; Future    Patient has been advised of split billing requirements and indicates understanding: Yes    COUNSELING:  Reviewed preventive health counseling, as reflected in " "patient instructions       Regular exercise       Healthy diet/nutrition       Colon cancer screening       Prostate cancer screening    Estimated body mass index is 36.57 kg/m  as calculated from the following:    Height as of this encounter: 1.8 m (5' 10.87\").    Weight as of this encounter: 118.5 kg (261 lb 3.2 oz).    Weight management plan: Discussed healthy diet and exercise guidelines    He reports that he has never smoked. He has never used smokeless tobacco.    Appropriate preventive services were discussed with this patient, including applicable screening as appropriate for cardiovascular disease, diabetes, osteopenia/osteoporosis, and glaucoma.  As appropriate for age/gender, discussed screening for colorectal cancer, prostate cancer, breast cancer, and cervical cancer. Checklist reviewing preventive services available has been given to the patient.    Reviewed patients plan of care and provided an AVS. The Basic Care Plan (routine screening as documented in Health Maintenance) for Jan meets the Care Plan requirement. This Care Plan has been established and reviewed with the Patient.    Rohan Dooley MD  [facility]    Identified Health Risks: obesity     "

## 2022-10-05 NOTE — LETTER
October 12, 2022      Erik Campa  [address] [address]        Dear ,    We are writing to inform you of your test results.    Your prostate test is normal.  The cholesterol levels are a bit high.  You could consider taking medication.  We can discuss that at your next visit, or sooner if you are interested.  I think it would be beneficial lowering your future risk of heart disease or stroke.      Resulted Orders   PSA, screen   Result Value Ref Range    Prostate Specific Antigen Screen 0.66 0.00 - 6.50 ng/mL    Narrative    This result is obtained using the Roche Elecsys total PSA method on the silver e801 immunoassay analyzer. Results obtained with different assay methods or kits cannot be used interchangeably.   Lipid panel reflex to direct LDL Fasting   Result Value Ref Range    Cholesterol 206 (H) <200 mg/dL    Triglycerides 150 (H) <150 mg/dL    Direct Measure HDL 36 (L) >=40 mg/dL    LDL Cholesterol Calculated 140 (H) <=100 mg/dL    Non HDL Cholesterol 170 (H) <130 mg/dL    Narrative    Cholesterol  Desirable:  <200 mg/dL    Triglycerides  Normal:  Less than 150 mg/dL  Borderline High:  150-199 mg/dL  High:  200-499 mg/dL  Very High:  Greater than or equal to 500 mg/dL    Direct Measure HDL  Female:  Greater than or equal to 50 mg/dL   Male:  Greater than or equal to 40 mg/dL    LDL Cholesterol  Desirable:  <100mg/dL  Above Desirable:  100-129 mg/dL   Borderline High:  130-159 mg/dL   High:  160-189 mg/dL   Very High:  >= 190 mg/dL    Non HDL Cholesterol  Desirable:  130 mg/dL  Above Desirable:  130-159 mg/dL  Borderline High:  160-189 mg/dL  High:  190-219 mg/dL  Very High:  Greater than or equal to 220 mg/dL   CBC with platelets   Result Value Ref Range    WBC Count 7.4 4.0 - 11.0 10e3/uL    RBC Count 5.59 4.40 - 5.90 10e6/uL    Hemoglobin 15.6 13.3 - 17.7 g/dL    Hematocrit 47.8 40.0 - 53.0 %    MCV 86 78 - 100 fL    MCH 27.9 26.5 - 33.0 pg    MCHC 32.6 31.5 - 36.5 g/dL    RDW  13.7 10.0 - 15.0 %    Platelet Count 255 150 - 450 10e3/uL       If you have any questions or concerns, please call the clinic at the number listed above.       Sincerely,      Rohan Dooley MD

## 2022-10-12 PROBLEM — E66.01 MORBID OBESITY (H): Status: ACTIVE | Noted: 2022-10-12

## 2022-10-12 PROBLEM — E78.5 HYPERLIPIDEMIA LDL GOAL <130: Status: ACTIVE | Noted: 2022-10-12

## 2022-10-19 ENCOUNTER — HOSPITAL ENCOUNTER (OUTPATIENT)
Dept: CT IMAGING | Facility: HOSPITAL | Age: 72
Discharge: HOME | End: 2022-10-19
Attending: INTERNAL MEDICINE | Admitting: INTERNAL MEDICINE
Payer: MEDICARE

## 2022-10-19 DIAGNOSIS — J34.89 NASAL OBSTRUCTION: ICD-10-CM

## 2022-10-19 PROCEDURE — G1010 CDSM STANSON: HCPCS

## 2022-10-19 NOTE — PATIENT INSTRUCTIONS - HE
I can help fill these out, do we have the forms?   Please call one of the [place] locations below to schedule an appointment. If you received a prescription please bring it with you to your appointment. Some locations are limited to what they carry.    Office Locations    [facility]  [address] [place] [address]  Home Medical Equipment, [address]   Phone: [phone]   Orthotics and Prosthetics, [address]   Phone: [phone]    [facility]  Home Medical Equipment  [address], [address], [place] [address]   Phone: [phone]    Orthotics and Prosthetics ([facility])    [address], [address], [place] [address]  Phone: [phone]    [facility]  [address]  [address]   [place] [address]   Phone: [phone]    [facility]  [address]  [place] [address]  Phone: [phone]    [facility]   [address]  [place] [address]  Phone: [phone]    [facility]  [address]  [place] [address]  Phone: [phone]    [facility]  [address]  [address]  [place] [address]  Phone: [phone]    [place]   [address] [place] [address]   Phone: [phone]

## 2022-10-28 DIAGNOSIS — I10 ESSENTIAL HYPERTENSION: ICD-10-CM

## 2022-10-30 RX ORDER — HYDROCHLOROTHIAZIDE 12.5 MG/1
12.5 TABLET ORAL DAILY
Qty: 30 TABLET | Refills: 0 | Status: SHIPPED | OUTPATIENT
Start: 2022-10-30 | End: 2022-11-27

## 2022-11-03 ENCOUNTER — VIRTUAL VISIT (OUTPATIENT)
Dept: FAMILY MEDICINE | Facility: CLINIC | Age: 72
End: 2022-11-03
Payer: MEDICARE

## 2022-11-03 ENCOUNTER — MYC MEDICAL ADVICE (OUTPATIENT)
Dept: FAMILY MEDICINE | Facility: CLINIC | Age: 72
End: 2022-11-03

## 2022-11-03 DIAGNOSIS — U07.1 INFECTION DUE TO 2019 NOVEL CORONAVIRUS: Primary | ICD-10-CM

## 2022-11-03 PROCEDURE — 99213 OFFICE O/P EST LOW 20 MIN: CPT | Mod: CS | Performed by: PHYSICIAN ASSISTANT

## 2022-11-03 NOTE — PROGRESS NOTES
Erik is a 72 year old who is being evaluated via a billable video visit.      How would you like to obtain your AVS? MyChart  If the video visit is dropped, the invitation should be resent by: Send to e-mail at: [email or web address]  Will anyone else be joining your video visit? No  Subjective   Erik is a 72 year old accompanied by his spouse, presenting for the following health issues:  Covid Concern      HPI       COVID-19 Symptom Review  How many days ago did these symptoms start? 2    Are any of the following symptoms significant for you?    New or worsening difficulty breathing? No    Worsening cough? Yes, I am coughing up mucus.    Fever or chills? No    Headache: No    Sore throat: YES    Chest pain: YES    Diarrhea: No    Body aches? No    What treatments has patient tried? Acetaminophen   Does patient live in a nursing home, group home, or shelter? No  Does patient have a way to get food/medications during quarantined? Yes, I have a friend or family member who can help me.    Symptoms started 2 days ago. Patient reports a productive cough, chest tightness and sore throat. No fevers, body aches or chills at this time. He has been taking Tylenol that helps. He has had 3 COVID vaccinations. Interested in monoclonal antibody infusion at this time.     Review of Systems   Constitutional, HEENT, cardiovascular, pulmonary, GI, , musculoskeletal, neuro, skin, endocrine and psych systems are negative, except as otherwise noted.      Objective           Vitals:  No vitals were obtained today due to virtual visit.    Physical Exam   GENERAL: Healthy, alert and no distress  EYES: Eyes grossly normal to inspection.  No discharge or erythema, or obvious scleral/conjunctival abnormalities.  RESP: No audible wheeze, cough, or visible cyanosis.  No visible retractions or increased work of breathing.    SKIN: Visible skin clear. No significant rash, abnormal pigmentation or lesions.  NEURO: Cranial nerves grossly intact.   Mentation and speech appropriate for age.  PSYCH: Mentation appears normal, affect normal/bright, judgement and insight intact, normal speech and appearance well-groomed.        Assessment & Plan     Infection due to 2019 novel coronavirus  Patient with COVID-19. Antibody infusion ordered as below. Encouraged continuation of supportive cares. Reviewed symptoms that should prompt immediate care in the ER.  - Covid Monoclonal Antibody Referral; Future    The patient indicates understanding of these issues and agrees with the plan.    MAICO Cheung [facility]    Video-Visit Details    Video Start Time: 11:17 AM    Type of service:  Video Visit    Video End Time:11:27 AM    Originating Location (pt. Location): Home        Distant Location (provider location):  On-site    Platform used for Video Visit: Eunice

## 2022-11-04 ENCOUNTER — HOME INFUSION (PRE-WILLOW HOME INFUSION) (OUTPATIENT)
Dept: PHARMACY | Facility: CLINIC | Age: 72
End: 2022-11-04

## 2022-11-04 NOTE — TELEPHONE ENCOUNTER
Patient was seen for virtual visit yesterday and referral for monoclonal antibodies ordered. He has not been contacted despite a referral being placed in Epic. Please see if you can help to get patient scheduled for this before the weekend - he does live in [place].     Rachel Gupta PA-C

## 2022-11-09 NOTE — PROGRESS NOTES
This is a recent snapshot of the patient's [facility] medical record.  For current drug dose and complete information and questions, call [phone] or In Basket pool, fv home infusion ([id number])  CSN Number:  [id number]

## 2022-11-22 DIAGNOSIS — I10 ESSENTIAL HYPERTENSION: ICD-10-CM

## 2022-11-24 NOTE — TELEPHONE ENCOUNTER
"Routing refill request to provider for review/approval because:  bp out of range    Last Written Prescription Date:  10/30/22  Last Fill Quantity: 30,  # refills: 0   Last office visit provider:  10/5/22     Requested Prescriptions   Pending Prescriptions Disp Refills     hydrochlorothiazide (HYDRODIURIL) 12.5 MG tablet 30 tablet 0     Sig: Take 1 tablet (12.5 mg) by mouth daily       Diuretics (Including Combos) Protocol Failed - 11/22/2022  5:39 PM        Failed - Blood pressure under 140/90 in past 12 months     BP Readings from Last 3 Encounters:   10/05/22 (!) 152/91   09/08/22 (!) 145/82   03/17/21 136/84                 Passed - Recent (12 mo) or future (30 days) visit within the authorizing provider's specialty     Patient has had an office visit with the authorizing provider or a provider within the authorizing providers department within the previous 12 mos or has a future within next 30 days. See \"Patient Info\" tab in inbasket, or \"Choose Columns\" in Meds & Orders section of the refill encounter.              Passed - Medication is active on med list        Passed - Patient is age 18 or older        Passed - Normal serum creatinine on file in past 12 months     Recent Labs   Lab Test 09/08/22  1042   CR 0.93              Passed - Normal serum potassium on file in past 12 months     Recent Labs   Lab Test 09/08/22  1042   POTASSIUM 5.0                    Passed - Normal serum sodium on file in past 12 months     Recent Labs   Lab Test 09/08/22  1042                      Angela Olmstead RN 11/23/22 6:10 PM  "

## 2022-11-27 RX ORDER — HYDROCHLOROTHIAZIDE 12.5 MG/1
12.5 TABLET ORAL DAILY
Qty: 30 TABLET | Refills: 0 | Status: SHIPPED | OUTPATIENT
Start: 2022-11-27 | End: 2022-12-22

## 2022-11-30 ASSESSMENT — SLEEP AND FATIGUE QUESTIONNAIRES
HOW LIKELY ARE YOU TO NOD OFF OR FALL ASLEEP WHILE SITTING AND READING: SLIGHT CHANCE OF DOZING
HOW LIKELY ARE YOU TO NOD OFF OR FALL ASLEEP WHILE SITTING INACTIVE IN A PUBLIC PLACE: WOULD NEVER DOZE
HOW LIKELY ARE YOU TO NOD OFF OR FALL ASLEEP WHILE WATCHING TV: SLIGHT CHANCE OF DOZING
HOW LIKELY ARE YOU TO NOD OFF OR FALL ASLEEP IN A CAR, WHILE STOPPED FOR A FEW MINUTES IN TRAFFIC: WOULD NEVER DOZE
HOW LIKELY ARE YOU TO NOD OFF OR FALL ASLEEP WHEN YOU ARE A PASSENGER IN A CAR FOR AN HOUR WITHOUT A BREAK: SLIGHT CHANCE OF DOZING
HOW LIKELY ARE YOU TO NOD OFF OR FALL ASLEEP WHILE SITTING AND TALKING TO SOMEONE: WOULD NEVER DOZE
HOW LIKELY ARE YOU TO NOD OFF OR FALL ASLEEP WHILE SITTING QUIETLY AFTER LUNCH WITHOUT ALCOHOL: WOULD NEVER DOZE
HOW LIKELY ARE YOU TO NOD OFF OR FALL ASLEEP WHILE LYING DOWN TO REST IN THE AFTERNOON WHEN CIRCUMSTANCES PERMIT: MODERATE CHANCE OF DOZING

## 2022-12-02 ENCOUNTER — OFFICE VISIT (OUTPATIENT)
Dept: SLEEP MEDICINE | Facility: CLINIC | Age: 72
End: 2022-12-02
Attending: INTERNAL MEDICINE
Payer: MEDICARE

## 2022-12-02 ENCOUNTER — OFFICE VISIT (OUTPATIENT)
Dept: OTOLARYNGOLOGY | Facility: CLINIC | Age: 72
End: 2022-12-02
Attending: INTERNAL MEDICINE
Payer: MEDICARE

## 2022-12-02 VITALS
BODY MASS INDEX: 37.51 KG/M2 | HEART RATE: 89 BPM | WEIGHT: 262 LBS | OXYGEN SATURATION: 96 % | DIASTOLIC BLOOD PRESSURE: 77 MMHG | RESPIRATION RATE: 16 BRPM | SYSTOLIC BLOOD PRESSURE: 127 MMHG | HEIGHT: 70 IN

## 2022-12-02 DIAGNOSIS — G47.33 OBSTRUCTIVE SLEEP APNEA: Primary | ICD-10-CM

## 2022-12-02 DIAGNOSIS — R06.83 SNORING: ICD-10-CM

## 2022-12-02 DIAGNOSIS — J34.89 NASAL OBSTRUCTION: ICD-10-CM

## 2022-12-02 PROCEDURE — 99203 OFFICE O/P NEW LOW 30 MIN: CPT | Performed by: STUDENT IN AN ORGANIZED HEALTH CARE EDUCATION/TRAINING PROGRAM

## 2022-12-02 PROCEDURE — 99203 OFFICE O/P NEW LOW 30 MIN: CPT | Performed by: OTOLARYNGOLOGY

## 2022-12-02 NOTE — ASSESSMENT & PLAN NOTE
STOP BANG score is 6/8. Patient likely has sleep apnea based on clinical history of snoring, HTN, BMI, age, neck circumference, gender, nocturia, unrefreshing sleep, and a.m. dry mouth.   Obstructive sleep apnea reviewed. Complications of Untreated Sleep Apnea Reviewed.  HST/ Polysomnography reviewed. Information provided regarding treatment options for JACOB.    Recommend in-lab sleep study due to patient unable to complete watchPAT 1 without smart phone and concerned he would be unable to set up Noxturnal T3 device himself

## 2022-12-02 NOTE — LETTER
12/2/2022         RE: Jan Campa  [address] [address]        Dear Colleague,    Thank you for referring your patient, Jan Campa, to the [facility]. Please see a copy of my visit note below.    HPI: This patient is a 73yo M who presents for evaluation of the nasal congestion at the request of Dr. Dooley. The patient reports chronic nasal congestion for about a year. There have not really been episodes of high fever, localized sinus pain, tooth pain, and pururlent drainage. He was not instructed to try any nasal spray, steroid or saline. He was sent for a CT of the sinuses that was clear. Also has a globus sensation off an on. Denies heartburn, has not tried reflux medications.    Past medical history, surgical history, social history, family history, medications, and allergies have been reviewed with the patient and are documented above.    Review of Systems: a 10-system review was performed. Pertinent positives are noted in the HPI and on a separate scanned document in the chart.    PHYSICAL EXAMINATION:  GEN: no acute distress, normocephalic. Overweight.  EYES: extraocular movements are intact, pupils are equal and round. Sclera clear.   EARS: auricles are normally formed. The external auditory canals are clear with minimal to no cerumen. Tympanic membranes are intact bilaterally with no signs of infection, effusion, retractions, or perforations.  NOSE: anterior nares are patent. There are no masses or lesions. The septum is non-obstructing. No percussive tenderness over the maxillary or frontal sinuses. Nasal mucosa dry and cracked throughout.  OC/OP: clear, dentition is in good repair. The tongue and palate are fully mobile and symmetric. No masses or lesions.  HP/L (scope): nasopharynx, base of tongue, vallecula, epiglottis, and pyriform sinuses are clear. The bilateral vocal folds are mobile and without lesion. There is interarytenoid pachydermia and some  hyperemia of the laryngeal surface of the epiglottis c/w LPR.  NECK: soft and supple. No lymphadenopathy or masses. Airway is midline.  NEURO: CN VII and XII symmetric. alert and oriented. No spontaneous nystagmus. Gait is normal.  PULM: breathing comfortably on room air, normal chest expansion with respiration  CARDS: no cyanosis or clubbing, normal carotid pulses    FLEXIBLE LARYNGOSCOPY: Scope inserted bilaterally to examine nasal tissue, nasopharynx, posterior oropharynx, hypopharynx, and larynx. See exam notes for exam finding details. Patient tolerated the procedure well.    CT SINUS: images and report reviewed and my impression is that the sinuses are clear. There is scant mucosal thickening in the ethmoids bilaterally without any obstruction.     MEDICAL DECISION-MAKING: This patient is a 71yo with two issues.  1. Nasal congestion from overdryness. He should make nasal saline part of his routine and his congestion sensations should improve.   2. Chronic laryngitis/globus sensation from acid reflux. Discussed diet and lifestyle changes, including weight loss, in addition to risks and benefits of H2 blocker vs PPI therapy. Can follow-up with PCP or GI for further management moving forward.        Again, thank you for allowing me to participate in the care of your patient.        Sincerely,        Poly Molina MD

## 2022-12-02 NOTE — PATIENT INSTRUCTIONS
"MY INFORMATION ON SLEEP APNEA-  Jan Campa    DOCTOR : Mahendra Collins MD  SLEEP CENTER :      MY CONTACT NUMBER:    [facility]  [phone]        Patrick Points:  1. What is Obstructive Sleep Apnea (JACOB)? JACOB is the most common type of sleep apnea. Apnea literally means, \"without breath.\" It is characterized by repetitive pauses in breathing, despite continued effort to breathe, and is usually associated with a reduction in blood oxygen saturation. Apneas can last 10 to over 60 seconds. It is caused by narrowing or collapse of the upper airway as muscles relax during sleep.       2. What are the consequences of JACOB? Symptoms include: daytime sleepiness- possibly increasing the risk of falling asleep while driving, unrefreshing/restless sleep, snoring, insomnia, waking frequently to urinate, waking with heartburn or reflux, reduced concentration and memory, and morning headaches. Other health consequences may include development of high blood pressure. Untreated JACOB also can contribute to heart disease, stroke and diabetes.   3. What are the treatment options? In most situations, sleep apnea is a lifelong disease that must be managed with daily therapy. Continuous Positive Airway (CPAP) is the most reliable treatment. A mouthguard to hold your jaw forward is usually the next most reliable option. Other options include postioning devices (to keep you off your back), nasal valves, tongue retaining device, weight loss, surgery. There is more detail about these options toward the end of this document.  4. What are the most important things to remember about using CPAP?     WHERE CAN I FIND MORE INFORMATION?    American Academy of Sleep Medicine Patient information on sleep disorders:  http://yoursleep.aasmnet.org    CPAP -  WHY AND HOW?                 Continuous positive airway pressure, or CPAP, is the most effective treatment for obstructive sleep apnea. It works by blowing room air, " "through a mask, to hold your throat open. A decision to use CPAP is a major step forward in the pursuit of a healthier life. The successful use of CPAP will help you breathe easier, sleep better and live healthier. Using CPAP can be a positive experience if you keep these pastor points in mind:  Commitment  CPAP is not a quick fix for your problem. It involves a long-term commitment to improve your sleep and your health.    Communication  Stay in close communication with both your sleep doctor and your CPAP supplier. Ask lots of questions and seek help when you need it.    Consistency  Use CPAP all night, every night and for every nap. You will receive the maximum health benefits from CPAP when you use it every time that you sleep. This will also make it easier for your body to adjust to the treatment.    Correction  The first machine and mask that you try may not be the best ones for you. Work with your sleep doctor and your CPAP supplier to make corrections to your equipment selection. Ask about trying a different type of machine or mask if you have ongoing problems. Make sure that your mask is a good fit and learn to use your equipment properly.    Challenge  Tell a family member or close friend to ask you each morning if you used your CPAP the previous night. Have someone to challenge you to give it your best effort.    Connection   Your adjustment to CPAP will be easier if you are able to connect with others who use the same treatment. Ask your sleep doctor if there is a support group in your area for people who have sleep apnea, or look for one on the Internet.  Comfort   Increase your level of comfort by using a saline spray, decongestant or heated humidifier if CPAP irritates your nose, mouth or throat. Use your unit's \"ramp\" setting to slowly get used to the air pressure level. There may be soft pads you can buy that will fit over your mask straps. Look on www.CPAP.com for accessories that can help make CPAP " use more comfortable.  Cleaning   Clean your mask, tubing and headgear on a regular basis. Put this time in your schedule so that you don't forget to do it. Check and replace the filters for your CPAP unit and humidifier.    Completion   Although you are never finished with CPAP therapy, you should reward yourself by celebrating the completion of your first month of treatment. Expect this first month to be your hardest period of adjustment. It will involve some trial and error as you find the machine, mask and pressure settings that are right for you.    Continuation  After your first month of treatment, continue to make a daily commitment to use your CPAP all night, every night and for every nap.    CPAP-Tips to starting with success:  Begin using your CPAP for short periods of time during the day while you watch TV or read.    Use CPAP every night and for every nap. Using it less often reduces the health benefits and makes it harder for your body to get used to it.    Newer CPAP models are virtually silent; however, if you find the sound of your CPAP machine to be bothersome, place the unit under your bed to dampen the sound.     Make small adjustments to your mask, tubing, straps and headgear until you get the right fit. Tightening the mask may actually worsen the leak.  If it leaves significant marks on your face or irritates the bridge of your nose, it may not be the best mask for you.  Speak with the person who supplied the mask and consider trying other masks. Insurances will allow you to try different masks during the first month of starting CPAP.  Insurance also covers a new mask, hose and filter about every 6 months.    Use a saline nasal spray to ease mild nasal congestion. Neti-Pot or saline nasal rinses may also help. Nasal gel sprays can help reduce nasal dryness.  Biotene mouthwash can be helpful to protect your teeth if you experience frequent dry mouth.  Dry mouth may be a sign of air escaping out  of your mouth or out of the mask in the case of a full face mask.  Speak with your provider if you expect that is the case.     Take a nasal decongestant to relieve more severe nasal or sinus congestion.  Do not use Afrin (oxymetazoline) nasal spray more than 3 days in a row.  Speak with your sleep doctor if your nasal congestion is chronic.    Use a heated humidifier that fits your CPAP model to enhance your breathing comfort. Adjust the heat setting up if you get a dry nose or throat, down if you get condensation in the hose or mask.  Position the CPAP lower than you so that any condensation in the hose drains back into the machine rather than towards the mask.    Try a system that uses nasal pillows if traditional masks give you problems.    Clean your mask, tubing and headgear once a week. Make sure the equipment dries fully.    Regularly check and replace the filters for your CPAP unit and humidifier.    Work closely with your sleep provider and your CPAP supplier to make sure that you have the machine, mask and air pressure setting that works best for you. It is better to stop using it and call your provider to solve problems than to lay awake all night frustrated with the device.    BESIDES CPAP, WHAT OTHER THERAPIES ARE THERE?  Postioning devices if you only have the snoring or apnea while on your back  Dental devices if your condition is mild  Nasal valves may be effective though experience is limited  Weight loss if you are overweight  Surgery in limited cases where devices are not acceptable or there are problems with structures in the nose and throat  If treated with one of these alternative options, further evaluation is necessary to ensure that the therapy is effective. This may require some form of repeat testing.      Healthy Lifestyle:  Healthy diet, exercise and limit alcohol: Not only will excessive alcohol increase your weight over time, but it irritates the throat tissues and make them swell,  shrinking the airway and causing snoring. Drinking alcohol should be limited and stopped within 3-4 hours before going to bed.   Stop smoking: (Red swollen throat, heat, nicotine), also irritates and swells the airway, among numerous other negative health consequences.    Positioning Device  This example shows a pillow that straps around the waist. It may be appropriate for those whose sleep study shows milder sleep apnea that occurs primarily when lying flat on one's back. Preliminary studies have shown benefit but effectiveness at home should be verified.                      Oral Appliance  These are examples of two of many custom-made devices that are more likely to work in mild sleep apnea   Oral appliances are dental mouth pieces that fit very much like a sports mouth guards or removable orthodontic retainers. They are used to treat snoring and obstructive sleep apnea . The device prevents the airway from collapsing by either supporting the jaw in a forward position. Since oral appliances are non-invasive and easy to use, they may be considered as an early treatment option. Oral appliance therapy (OAT) involves the customization, selection, fabrication, fitting, adjustments and long-term follow-up care.  Custom made oral appliances are proven to be more effective than over-the-counter devices. Therefore, the over-the-counter devices are recommended not to be used as a screening tool nor as a therapeutic option.     Who gets a dental device?  Oral appliance therapy can be used as an alternative to CPAP therapy for the treatment of mild to moderate sleep apnea and for those patients who prefer OAT to CPAP. Oral appliance therapy is a first line therapy for the treatment of primary snoring. Additionally, OAT is an option for those that cannot tolerate CPAP as therapy or who have experienced insufficient surgical results.                 Possible side effects?  Frequent but minor side effects include: excessive  salivation, dry mouth, discomfort of teeth and jaw and temporary changes in the patient s bite.  Potential complications include: jaw pain, permanent occlusal changes and TMJ symptoms.  The above mentioned side effects and complications can be recognized and managed by dentists trained in dental sleep medicine.   Finding a dentist that practices dental sleep medicine  Specific training is available through the American Academy of Dental Sleep Medicine for dentists interested in working in the field of sleep. To find a dentist who is educated in the field of sleep and the use of oral appliances, near you, visit the Web site of the American Academy of Dental Sleep Medicine; also see http://www.accpstorage.org/newOrganization/patients/oralAppliances.pdf   To search for a dentist certified in these practices:  Http://aadsm.org/FindADentist.aspx?1  Nasal Valves              Nasal valves may be an option for mild apnea if other options are not well tolerated. The efficacy of these devices is generally less than CPAP or oral appliances.They may not be effective if you have frequent nasal congestion or have difficulty breathing through your nose.   Weight Loss:    Weight loss decreases severity of sleep apnea in most people with obesity. For those with mild obesity who have developed snoring with weight gain, even 15-30 pound weight loss can improve and occasionally eliminate sleep apnea.  Structured and life-long dietary and health habits are necessary to lose weight and keep healthier weight levels.     Though there are significant health benefits from weight loss, long-term weight loss is very difficult to achieve- studies show success with dietary management in less than 10% of people. In addition, substantial weight loss may require years of dietary control and may be difficult if patients have severe obesity. In these cases, surgical management may be considered.    If you are interested in methods for weight loss,  you should review the options discussed at the National Institutes of Health patient information sites:     http://win.niddk.nih.gov/publications/index.htm  http:/www.health.nih.gov/topic/WeightLossDieting    Bariatric programs offer counseling in all methods of weight loss:    Http:/[email or web address]/htm    Your BMI is Body mass index is 37.59 kg/m .  Weight management plan: Discussed healthy diet and exercise guidelines    Body mass index (BMI) is one way to tell whether you are at a healthy weight, overweight, or obese. It measures your weight in relation to your height.  A BMI of 18.5 to 24.9 is in the healthy range. A person with a BMI of 25 to 29.9 is considered overweight, and someone with a BMI of 30 or greater is considered obese.  Another way to find out if you are at risk for health problems caused by overweight and obesity is to measure your waist. If you are a woman and your waist is more than 35 inches, or if you are a man and your waist is more than 40 inches, your risk of disease may be higher.  More than two-thirds of American adults are considered overweight or obese. Being overweight or obese increases the risk for further weight gain.  Excess weight may lead to heart disease and diabetes. Creating and following plans for healthy eating and physical activity may help you improve your health.    Methods for maintaining or losing weight.    Weight control is part of healthy lifestyle and includes exercise, emotional health, and healthy eating habits.  Careful eating habits lifelong is the mainstay of weight control.  Though there are significant health benefits from weight loss, long-term weight loss with diet alone may be very difficult to achieve- studies show long-term success with dietary management in less than 10% of people. Attaining a healthy weight may be especially difficult to achieve in those with severe obesity. In some cases,  "medications, devices and surgical management might be considered.    What can you do?    If you are overweight or obese and are interested in methods for weight loss, you should discuss this with your provider. In addition, we recommend that you review healthy life styles and methods for weight loss available through the National Institutes of Health patient information sites:     http://win.niddk.nih.gov/publications/index.htm      Surgery:  There are a number of surgeries that have been attempted to treat apnea. In general, surgical options are usually reserved for cases in which there is a physical abnormality contributing to obstruction or other treatment options are ineffective or not tolerated. Most surgical options are either unreliable or quite invasive. One of the more common procedures is:  Uvulopalatopharyngoplasty: In this procedure, the uvula (the finger-like tissue that hangs in the back of the throat), part of the soft palate (the tissue that the uvula is attached to), and sometimes the tonsils or adenoids are removed. The efficacy of this surgery is around 30-50% .  After surgery, complications may include:  Sleepiness and sleep apnea related to post-surgery medication   Swelling, infection and bleeding   A sore throat and/or difficulty swallowing   Drainage of secretions into the nose and a nasal quality to the voice. English language speech does not seem to be affected by this surgery.   Narrowing of the airway in the nose and throat (hence constricting breathing) snoring and even iatrogenically caused sleep apnea. By cutting the tissues, excess scar tissue can \"tighten\" the airway and make it even smaller than it was before UPPP.  Patients who have had the uvula removed will become unable to correctly speak Croatian or any other language that has a uvular 'r' phoneme.    Surgeries to help resolve nasal congestion may help reduce the severity of apnea slightly. Nasal congestion does not cause apnea " on its own, so these surgeries are usually not performed just for JACOB.  They may be worth considering if the nasal congestion is significantly bothersome independent of apnea.

## 2022-12-02 NOTE — PROGRESS NOTES
[facility]  Consultation Note    Name: Jan Campa MRN#: [id number]   Age: 72 year old YOB: 1950     Date of Consultation: 12/02/22  Consultation is requested by: Rohan Dooley  Primary care provider: Rohan Dooley MD    History of Present Illness:   Jan Campa is a 72 year old male patient with HTN, HLD, GERD, and obesity  He is sent by Rohan Dooley for a sleep consultation regarding Consult (Discuss snoring)  .    Jan Campa main reason for visit: Doctor advise due to loud snoring  Patient states problem(s) started: Many years ago  Jan Campa's goals for this visit: See if there are any problems on account of my snoring.    He has not had a previous sleep study.    CPAP: No    SLEEP-WAKE SCHEDULE:   Work/School Days: Patient goes to school/work: Yes   Usually gets into bed at 2 am  Takes patient about 1/2 hour to fall asleep  Has trouble falling asleep It s a consistent 1/2 hour to fall asleep nightly. nights per week  Wakes up in the middle of the night Every couple of hours. times.  Wakes up due to Use the bathroom  He has trouble falling back asleep Don t have a problem times a week.   It usually takes 12 minutes estimate to get back to sleep  Patient is usually up at 9 am  Uses alarm: No      Weekends/Non-work Days/All Other Days:  Usually gets into bed at 2 am   Takes patient about 1/2 hour to fall asleep  Patient is usually up at 9 am  Uses alarm: No    Sleep Need  Patient gets  6-7 hours sleep on average   Patient thinks He needs about 8 hours sleep    Jan Campa prefers to sleep in this position(s): Back;Side;Recliner   Patient states they do the following activities in bed: Read    Naps  Patient takes a purposeful nap 3 times times a week and naps are usually 15-45 minutes in duration  He feels better after a nap: Yes  He dozes off unintentionally 3 times days per week  Patient has had a driving accident or near-miss due to sleepiness/drowsiness:  No      SLEEP DISRUPTIONS:  Breathing/Snoring  Patient snores:Yes  Other people complain about His snoring: Yes  Patient has been told He stops breathing in His sleep:Yes  He has issues with the following: Morning mouth dryness;Stuffy nose when you wake up;Getting up to urinate more than once    Movement:  Patient gets pain, discomfort, with an urge to move:  No  It happens when He is resting:  No  It happens more at night:  No  Patient has been told Jan Campa kicks His legs at night:  No     Behaviors in Sleep:  Jan Campa has experienced the following behaviors while sleeping:  (No)  He has experienced sudden muscle weakness during the day: No     Is there anything else you would like your sleep provider to know:        CAFFEINE AND OTHER SUBSTANCES:  Patient consumes caffeinated beverages per day:  2-3 cups of coffee  Last caffeine use is usually: 3 pm  List of any prescribed or over the counter stimulants that patient takes: None  List of any prescribed or over the counter sleep medication patient takes: None  List of previous sleep medications that patient has tried: None  Patient drinks alcohol to help them sleep: No  Patient drinks alcohol near bedtime: No    Family History:  Patient has a family member been diagnosed with a sleep disorder: Yes  2 brothers (JACOB on CPAP)         SCALES:  EPWORTH SLEEPINESS SCALE    Ralph Sleepiness Scale ( GRICELDA Carlin  [id number]<br>ESS - [place]/English - Final version - 21 Nov 07 - [facility] Research [employer].) 11/30/2022   Sitting and reading Slight chance of dozing   Watching TV Slight chance of dozing   Sitting, inactive in a public place (e.g. a theatre or a meeting) Would never doze   As a passenger in a car for an hour without a break Slight chance of dozing   Lying down to rest in the afternoon when circumstances permit Moderate chance of dozing   Sitting and talking to someone Would never doze   Sitting quietly after a lunch without alcohol Would never doze   In a  car, while stopped for a few minutes in traffic Would never doze   Belmont Score (MC) 5   Belmont Score (Sleep) 5       INSOMNIA SEVERITY INDEX (ALVAREZ)    Insomnia Severity Index (ALVAREZ) 11/30/2022   Difficulty falling asleep 1   Difficulty staying asleep 1   Problems waking up too early 1   How SATISFIED/DISSATISFIED are you with your CURRENT sleep pattern? 2   How NOTICEABLE to others do you think your sleep problem is in terms of impairing the quality of your life? 1   How WORRIED/DISTRESSED are you about your current sleep problem? 2   To what extent do you consider your sleep problem to INTERFERE with your daily functioning (e.g. daytime fatigue, mood, ability to function at work/daily chores, concentration, memory, mood, etc.) CURRENTLY? 2   ALVAREZ Total Score 10     Guidelines for Scoring/Interpretation:  Total score categories:  0-7 = No clinically significant insomnia   8-14 = Subthreshold insomnia   15-21 = Clinical insomnia (moderate severity)  22-28 = Clinical insomnia (severe)  Used via courtesy of www.WordWatch.va.gov with permission from Bruce Loyola PhD., [facility]      STOP BANG   STOP BANG Questionnaire (  2008, the American Society of Anesthesiologists, Inc. [employer] 12/2/2022   1. Snoring - Do you snore loudly (louder than talking or loud enough to be heard through closed doors)? -   2. Tired - Do you often feel tired, fatigued, or sleepy during daytime? -   3. Observed - Has anyone observed you stop breathing during your sleep? -   4. Blood pressure - Do you have or are you being treated for high blood pressure? -   5. BMI - BMI more than 35 kg/m2? -   6. Age - Age over 50 yr old? -   7. Neck circumference - Neck circumference greater than 40 cm? -   8. Gender - Gender male? -   STOP BANG Score (MC): -   Neck Cir (cm) Clinic: 41   B/P Clinic: 127/77   BMI Clinic: 37.59         GAD7  No flowsheet data found.      CAGE-AID  No flowsheet data found.    CAGE-AID  "reprinted with permission from the [facility] Journal, OLIVER Neumann. and SAVANNAH Raya, \"Conjoint screening questionnaires for alcohol and drug abuse\" [place] Medical Journal 94: 135-140, 1995.      PATIENT HEALTH QUESTIONNAIRE-9 (PHQ - 9)  PHQ-9 ([employer]) 3/17/2021   1.  Little interest or pleasure in doing things 0   2.  Feeling down, depressed, or hopeless 0     Developed by Yamileth Mahan, Rachel Burks, Brendan Perez and colleagues, with an educational segun from [employer] No permission required to reproduce, translate, display or distribute.      Allergies:    Allergies   Allergen Reactions     Seasonal Allergies Dermatitis and Shortness Of Breath       Medications:    Current Outpatient Medications   Medication Sig Dispense Refill     hydrochlorothiazide (HYDRODIURIL) 12.5 MG tablet Take 1 tablet (12.5 mg) by mouth daily 30 tablet 0     lisinopril (ZESTRIL) 30 MG tablet Take 1 tablet (30 mg) by mouth daily 90 tablet 3     omega-3 fatty acids (FISH OIL CONCENTRATE ORAL) Take 2 capsules by mouth daily       petrolat,wht/min oil/sod chl (DRY EYES OPHT) [PETROLAT,WHT/MIN OIL/SOD CHL (DRY EYES OPHT)] Administer 1 drop to both eyes daily.         Problem List:  Patient Active Problem List    Diagnosis Date Noted     Obstructive sleep apnea 12/02/2022     Priority: Medium     Hyperlipidemia LDL goal <130 10/12/2022     Priority: Medium     Morbid obesity (H) 10/12/2022     Priority: Medium     Chronic venous insufficiency 10/05/2022     Priority: Medium     Pes planus of both feet 10/05/2022     Priority: Medium     Lumbosacral spondylosis with radiculopathy 03/17/2021     Priority: Medium     Class 2 obesity due to excess calories without serious comorbidity in adult 03/17/2021     Priority: Medium     Spinal stenosis of lumbar region with neurogenic claudication 03/17/2021     Priority: Medium     Snoring 03/17/2021     Priority: Medium     Essential hypertension      Priority: Medium     Created " by Conversion  Replacement Utility updated for latest IMO load            Past Medical/Surgical History:  Past Medical History:   Diagnosis Date     Chronic venous insufficiency 10/05/2022     Essential hypertension      Hyperlipidemia LDL goal <130      Lumbosacral spondylosis with radiculopathy      Pes planus of both feet      Snoring 03/17/2021     Spinal stenosis of lumbar region with neurogenic claudication 03/17/2021     Ventral hernia      Past Surgical History:   Procedure Laterality Date     ABDOMINAL HERNIA REPAIR         Social History:  Social History     Socioeconomic History     Marital status:      Spouse name: Not on file     Number of children: Not on file     Years of education: Not on file     Highest education level: Not on file   Occupational History     Not on file   Tobacco Use     Smoking status: Never     Smokeless tobacco: Never   Substance and Sexual Activity     Alcohol use: Yes     Comment: Alcoholic Drinks/day: 2-3 beers weekly     Drug use: No     Sexual activity: Not on file   Other Topics Concern     Not on file   Social History Narrative    Likes walking and boating.  Three children.  Worked as .      Social Determinants of Health     Financial Resource Strain: Not on file   Food Insecurity: Not on file   Transportation Needs: Not on file   Physical Activity: Not on file   Stress: Not on file   Social Connections: Not on file   Intimate Partner Violence: Not on file   Housing Stability: Not on file       Family History:  Family History   Problem Relation Age of Onset     Breast Cancer Mother      Kidney Cancer Father      Prostate Cancer Father      Cancer Maternal Grandmother      Cancer Maternal Grandfather      Cancer Paternal Grandmother      Cancer Paternal Grandfather      Migraines Daughter        Review of Systems:   A complete review of systems reviewed by me is negative with the exeption of what has been mentioned in the history of present illness.  In  "the last TWO WEEKS have you experienced any of the following symptoms?  Fevers: No  Night Sweats: No  Weight Gain: No  Pain at Night: No  Double Vision: No  Changes in Vision: No  Difficulty Breathing through Nose: Yes  Sore Throat in Morning: No  Dry Mouth in the Morning: Yes  Shortness of Breath Lying Flat: No  Shortness of Breath With Activity: Yes  Awakening with Shortness of Breath: No  Increased Cough: No  Heart Racing at Night: No  Swelling in Feet or Legs: No  Diarrhea at Night: No  Heartburn at Night: No  Urinating More than Once at Night: Yes  Losing Control of Urine at Night: No  Joint Pains at Night: No  Headaches in Morning: No  Weakness in Arms or Legs: No  Depressed Mood: No  Anxiety: No     Physical Exam:   Vitals: /77   Pulse 89   Resp 16   Ht 1.778 m (5' 10\")   Wt 118.8 kg (262 lb)   SpO2 96%   BMI 37.59 kg/m    BMI= Body mass index is 37.59 kg/m .  Neck Cir (cm): 41 cm  Mandibular profile: mild retrognathia  Mallampati Class: II.  Tonsillar Stage: 2  visible at pillars.  GENERAL: Healthy, alert and no distress  EYES: Eyes grossly normal to inspection.  No discharge or erythema, or obvious scleral/conjunctival abnormalities.  RESP: No audible wheeze, cough, or visible cyanosis.  No visible retractions or increased work of breathing.    SKIN: Visible skin clear. No significant rash, abnormal pigmentation or lesions.  NEURO: Cranial nerves grossly intact.  Mentation and speech appropriate for age.  PSYCH: Mentation appears normal, affect normal/bright, judgement and insight intact, normal speech and appearance well-groomed.         Data: All pertinent previous laboratory data reviewed     Recent Labs   Lab Test 09/08/22  1042 03/17/21  1105    141   POTASSIUM 5.0 5.0   CHLORIDE 105 105   CO2 26 27   ANIONGAP 9 9   * 105   BUN 15.5 17   CR 0.93 0.82   LAKSHMI 9.1 9.0       Recent Labs   Lab Test 10/05/22  1235   WBC 7.4   RBC 5.59   HGB 15.6   HCT 47.8   MCV 86   MCH 27.9   MCHC " 32.6   RDW 13.7          Recent Labs   Lab Test 09/08/22  1042   PROTTOTAL 7.0   ALBUMIN 4.1   BILITOTAL 0.6   ALKPHOS 54   AST 27   ALT 16       No results found for: TSH    No results found for: UAMP, UBARB, BENZODIAZEUR, UCANN, UCOC, OPIT, UPCP    No results found for: IRONSAT, [id number], NOHEMY    No results found for: PH, PHARTERIAL, PO2, [id number], SAT, PCO2, HCO3, BASEEXCESS, EDWARD, BEB    @LABRCNTIPR(phv:4,pco2v:4,po2v:4,hco3v:4,nancy:4,o2per:4)@    Echocardiology: No results found for this or any previous visit (from the past 4320 hour(s)).    Chest x-ray: No results found for this or any previous visit from the past 365 days.      Chest CT: No results found for this or any previous visit from the past 365 days.      PFT: Most Recent [facility] Pulmonary Function Testing  No results found for: 20001  No results found for: 20002  No results found for: 20003  No results found for: 20015  No results found for: 20016  No results found for: 20027  No results found for: 20028  No results found for: 20029  No results found for: 20079  No results found for: 20080  No results found for: 20081  No results found for: 20335  No results found for: 20105  No results found for: 20053  No results found for: 20054  No results found for: 20055      Assessment and Plan:     Problem List Items Addressed This Visit        Respiratory    Snoring    Obstructive sleep apnea - Primary     STOP BANG score is 6/8. Patient likely has sleep apnea based on clinical history of snoring, HTN, BMI, age, neck circumference, gender, nocturia, unrefreshing sleep, and a.m. dry mouth.   Obstructive sleep apnea reviewed. Complications of Untreated Sleep Apnea Reviewed.  HST/ Polysomnography reviewed. Information provided regarding treatment options for JACOB.    Recommend in-lab sleep study due to patient unable to complete watchPAT 1 without smart phone and concerned he would be unable to set up Noxturnal T3 device himself             Relevant Orders     Comprehensive Sleep Study    Comprehensive Sleep Study         Patient was strongly advised to avoid driving, operating any heavy machinery or other hazardous situations while drowsy or sleepy.  Patient was counseled on the importance of driving while alert, to pull over if drowsy, or nap before getting into the vehicle if sleepy.      Plan is for Jan PRUDENCE Campa to follow up in about 2 week(s) following PSG.     The above note was dictated using voice recognition software. Although reviewed after completion, some word and grammatical error may remain . Please contact the author for any clarifications.    Total time spent reviewing medical records, history and physical examination, review of previous testing and interpretation as well as documentation on this date, 12/02/22: 38 minutes    Mahendra Collins MD on 12/2/2022   [facility]   [address], [address]   [address] [place] [address]. [place] [address]   Appointments: [phone]    CC: Rohan Dooley

## 2022-12-02 NOTE — PROGRESS NOTES
HPI: This patient is a 71yo M who presents for evaluation of the nasal congestion at the request of Dr. Dooley. The patient reports chronic nasal congestion for about a year. There have not really been episodes of high fever, localized sinus pain, tooth pain, and pururlent drainage. He was not instructed to try any nasal spray, steroid or saline. He was sent for a CT of the sinuses that was clear. Also has a globus sensation off an on. Denies heartburn, has not tried reflux medications.    Past medical history, surgical history, social history, family history, medications, and allergies have been reviewed with the patient and are documented above.    Review of Systems: a 10-system review was performed. Pertinent positives are noted in the HPI and on a separate scanned document in the chart.    PHYSICAL EXAMINATION:  GEN: no acute distress, normocephalic. Overweight.  EYES: extraocular movements are intact, pupils are equal and round. Sclera clear.   EARS: auricles are normally formed. The external auditory canals are clear with minimal to no cerumen. Tympanic membranes are intact bilaterally with no signs of infection, effusion, retractions, or perforations.  NOSE: anterior nares are patent. There are no masses or lesions. The septum is non-obstructing. No percussive tenderness over the maxillary or frontal sinuses. Nasal mucosa dry and cracked throughout.  OC/OP: clear, dentition is in good repair. The tongue and palate are fully mobile and symmetric. No masses or lesions.  HP/L (scope): nasopharynx, base of tongue, vallecula, epiglottis, and pyriform sinuses are clear. The bilateral vocal folds are mobile and without lesion. There is interarytenoid pachydermia and some hyperemia of the laryngeal surface of the epiglottis c/w LPR.  NECK: soft and supple. No lymphadenopathy or masses. Airway is midline.  NEURO: CN VII and XII symmetric. alert and oriented. No spontaneous nystagmus. Gait is normal.  PULM: breathing  comfortably on room air, normal chest expansion with respiration  CARDS: no cyanosis or clubbing, normal carotid pulses    FLEXIBLE LARYNGOSCOPY: Scope inserted bilaterally to examine nasal tissue, nasopharynx, posterior oropharynx, hypopharynx, and larynx. See exam notes for exam finding details. Patient tolerated the procedure well.    CT SINUS: images and report reviewed and my impression is that the sinuses are clear. There is scant mucosal thickening in the ethmoids bilaterally without any obstruction.     MEDICAL DECISION-MAKING: This patient is a 73yo with two issues.  1. Nasal congestion from overdryness. He should make nasal saline part of his routine and his congestion sensations should improve.   2. Chronic laryngitis/globus sensation from acid reflux. Discussed diet and lifestyle changes, including weight loss, in addition to risks and benefits of H2 blocker vs PPI therapy. Can follow-up with PCP or GI for further management moving forward.

## 2022-12-12 ENCOUNTER — TELEPHONE (OUTPATIENT)
Dept: GASTROENTEROLOGY | Facility: CLINIC | Age: 72
End: 2022-12-12

## 2022-12-12 NOTE — TELEPHONE ENCOUNTER
Screening Questions  BLUE  KIND OF PREP RED  LOCATION [review exclusion criteria] GREEN  SEDATION TYPE        Y Are you active on mychart?       ROTILIE Ordering/Referring Provider?        MEDICARE/BCBS What type of coverage do you have?      N Have you had a positive covid test in the last 14 days?     35.4 1. BMI  [BMI 40+ - review exclusion criteria]    Y  2. Are you able to give consent for your medical care? [IF NO,RN REVIEW]        N  3. Are you taking any prescription pain medications on a routine schedule?        3a. EXTENDED PREP What kind of prescription?     N 4. Do you have any chemical dependencies such as alcohol, street drugs, or methadone?    N 5. Do you have any history of post-traumatic stress syndrome, severe anxiety or history of psychosis?      **If yes 3- 5 , please schedule with MAC sedation.**          IF YES TO ANY 6 - 10 - HOSPITAL SETTING ONLY.     N 6.   Do you need assistance transferring?     N 7.   Have you had a heart or lung transplant?    N 8.   Are you currently on dialysis?   N 9.   Do you use daily home oxygen?   N 10. Do you take nitroglycerin?   10a.  If yes, how often?     11. [FEMALES]   Are you currently pregnant?    11a.  If yes, how many weeks? [ Greater than 12 weeks, OR NEEDED]    N 12. Do you have Pulmonary Hypertension? *NEED PAC APPT AT [facility]*     N? 13. [review exclusion criteria]  Do you have any implantable devices in your body (pacemaker, defib, LVAD)?    N 14. In the past 6 months, have you had any heart related issues including cardiomyopathy or heart attack?     14a.  If yes, did it require cardiac stenting if so when?     N 15. Have you had a stroke or Transient ischemic attack (TIA - aka  mini stroke ) within 6 months?      N 16. Do you have mod to severe Obstructive Sleep Apnea?  [Hospital only - Ok at [facility]] BEING TESTED IN JAN    N 17. Do you have SEVERE AND UNCONTROLLED asthma? *NEED PAC APPT AT [facility]*     N 18. Are you currently taking any blood  "thinners?     18a. If yes, inform patient to \"follow up w/ ordering provider for bridging instructions.\"    N 19. Do you take the medication Phentermine?    19a. If yes, \"Hold for 7 days before procedure.  Please consult your prescribing provider if you have questions about holding this medication.\"     N  20. Do you have chronic kidney disease?      N  21. Do you have a diagnosis of diabetes?     N  22. On a regular basis do you go 3-5 days between bowel movements?      23. Preferred LOCAL Pharmacy for Pre Prescription    [ LIST ONLY ONE PHARMACY]     [id number] - [place], [place] - [address]        - CLOSING REMINDERS -    Informed patient they will need an adult    Cannot take any type of public or medical transportation alone    Conscious Sedation- Needs  for 6 hours after the procedure       MAC/General-Needs  for 24 hours after procedure    Pre-Procedure Covid test to be completed [[facility] PCR Testing Required]    Confirmed Nurse will call to complete assessment           THE PATIENT IS CHECKING WITH HIS PCP ABOUT THIS REFERRAL. HE WAS LAST SEEN BY MAGDALENA. HE HAS THE  [facility] SCHEDULING NUMBER AND WILL CALL BACK AS NEEDED.  "

## 2022-12-22 DIAGNOSIS — I10 ESSENTIAL HYPERTENSION: ICD-10-CM

## 2022-12-23 RX ORDER — HYDROCHLOROTHIAZIDE 12.5 MG/1
12.5 TABLET ORAL DAILY
Qty: 30 TABLET | Refills: 3 | Status: SHIPPED | OUTPATIENT
Start: 2022-12-23 | End: 2023-03-24

## 2023-01-17 ENCOUNTER — THERAPY VISIT (OUTPATIENT)
Dept: SLEEP MEDICINE | Facility: CLINIC | Age: 73
End: 2023-01-17
Payer: MEDICARE

## 2023-01-17 DIAGNOSIS — G47.33 OBSTRUCTIVE SLEEP APNEA: ICD-10-CM

## 2023-01-17 PROCEDURE — 95810 POLYSOM 6/> YRS 4/> PARAM: CPT | Performed by: STUDENT IN AN ORGANIZED HEALTH CARE EDUCATION/TRAINING PROGRAM

## 2023-01-18 NOTE — PROGRESS NOTES
Diagnostic PSG completed per provider order.  Patient met criteria for PAP therapy.    Frankc Solis  Polysomngraphy [facility]

## 2023-01-18 NOTE — PATIENT INSTRUCTIONS
[facility]    1. Your sleep study will be reviewed by a sleep physician within the next few days.     2. Please follow up in the sleep clinic as scheduled, or, make an appointment with your sleep provider to be seen within two weeks to discuss the results of the sleep study.    3. If you have any questions or problems with your treatment plan, please contact your sleep clinic provider at [phone] to further manage your condition.    4. Please review your attached medication list, and, at your follow-up appointment advise your sleep clinic provider about any changes.    5. Go to [email or web address] for more information about your sleep problems.    Franck Solis CNA, RPSGT  January 18, 2023

## 2023-01-26 LAB — SLPCOMP: NORMAL

## 2023-02-02 ASSESSMENT — SLEEP AND FATIGUE QUESTIONNAIRES
HOW LIKELY ARE YOU TO NOD OFF OR FALL ASLEEP WHEN YOU ARE A PASSENGER IN A CAR FOR AN HOUR WITHOUT A BREAK: WOULD NEVER DOZE
HOW LIKELY ARE YOU TO NOD OFF OR FALL ASLEEP WHILE SITTING QUIETLY AFTER LUNCH WITHOUT ALCOHOL: SLIGHT CHANCE OF DOZING
HOW LIKELY ARE YOU TO NOD OFF OR FALL ASLEEP WHILE SITTING INACTIVE IN A PUBLIC PLACE: WOULD NEVER DOZE
HOW LIKELY ARE YOU TO NOD OFF OR FALL ASLEEP WHILE SITTING AND TALKING TO SOMEONE: WOULD NEVER DOZE
HOW LIKELY ARE YOU TO NOD OFF OR FALL ASLEEP WHILE LYING DOWN TO REST IN THE AFTERNOON WHEN CIRCUMSTANCES PERMIT: HIGH CHANCE OF DOZING
HOW LIKELY ARE YOU TO NOD OFF OR FALL ASLEEP WHILE WATCHING TV: HIGH CHANCE OF DOZING
HOW LIKELY ARE YOU TO NOD OFF OR FALL ASLEEP WHILE SITTING AND READING: WOULD NEVER DOZE
HOW LIKELY ARE YOU TO NOD OFF OR FALL ASLEEP IN A CAR, WHILE STOPPED FOR A FEW MINUTES IN TRAFFIC: WOULD NEVER DOZE

## 2023-02-03 ENCOUNTER — OFFICE VISIT (OUTPATIENT)
Dept: SLEEP MEDICINE | Facility: CLINIC | Age: 73
End: 2023-02-03
Payer: MEDICARE

## 2023-02-03 VITALS
HEART RATE: 80 BPM | SYSTOLIC BLOOD PRESSURE: 160 MMHG | DIASTOLIC BLOOD PRESSURE: 99 MMHG | WEIGHT: 257.8 LBS | HEIGHT: 70 IN | OXYGEN SATURATION: 98 % | BODY MASS INDEX: 36.91 KG/M2

## 2023-02-03 DIAGNOSIS — G47.33 OBSTRUCTIVE SLEEP APNEA: Primary | ICD-10-CM

## 2023-02-03 PROCEDURE — 99214 OFFICE O/P EST MOD 30 MIN: CPT | Performed by: STUDENT IN AN ORGANIZED HEALTH CARE EDUCATION/TRAINING PROGRAM

## 2023-02-03 NOTE — ASSESSMENT & PLAN NOTE
Sleep study showed Severe obstructive sleep apnea. Sleep related hypoxemia was present.  Additional findings from the sleep study showed abnormal findings including AHI 62.3, Supine AHI 74.5, REM AHI 58.3, and Time spent </= 88% 53.5    Following discussion with patient a shared decision was made to begin therapy with auto-CPAP at 5-15 cmH2O.

## 2023-02-03 NOTE — PATIENT INSTRUCTIONS
MY INFORMATION ON SLEEP APNEA-  Jan Campa    DOCTOR : Mahendra Collins MD  SLEEP CENTER :      MY CONTACT NUMBER:    [facility]  [phone]  [facility]      For general sleep health questions:   http://sleepeducation.org    For tips about PAP and COVID-19:  https://www.thoracic.org/patients/patient-resources/resources/covid-19-and-home-pap-therapy.pdf    For general info about COVID-19 including vaccines:  https://[employer].org/covid19      Continue PAP therapy every night, for all hours that you are sleeping (including naps.)  As always, try to get at least 8 hours of sleep or more each day, keep a regular sleep schedule, and avoid sleep deprivation. Avoid alcohol.  Reasons that you might need a change to your pressure therapy would be weight gain or loss, waking having inadvertently removed your PAP overnight, having previously felt refreshed by sleep with CPAP use and now waking un-refreshed, and return of daytime sleepiness. Also, the development of new medical problems  (such as heart failure, stroke, medications such as narcotics) can sometimes affect breathing at night and change your PAP therapy needs.  Please bring PAP with you if you are hospitalized.  If anticipating surgery be sure to discuss with your surgeon that you have sleep apnea and use PAP therapy.    Maintain your equipment as recommended which includes routine cleaning and replacement of supplies.      Call DME for any questions regarding supplies or maintenance.    [place] [facility], [facility] [phone]    Do not drive on engage in potentially dangerous activities if feeling sleepy.  Please follow up in sleep clinic again in 3 months.        Tips for your PAP use-    Mask fitting tips  Mask fitting exercise:    To improve your mask seal and your mobility at night, put mask on and secure in place.  Lie down in bed with full pressure and roll  to one side, adjust headgear while in that position to eliminate any leaks. Repeat process rolling to other side.     The mask seal does not have to be perfect:   CPAP machines are designed to make up for small leaks. However, you will not tolerate leaks blowing in your eyes so you will need to adjust.   Any leak should only be near or at the bottom of the mask.  We expect your mask to leak slightly at night.    Do not over-tighten the headgear straps, tighter IS NOT better, we expect minimal leak.    First try re-positioning the mask or headgear before tightening the headgear straps.  Mask leaks are expected due to changing sleeping positions. Try pulling the mask away from your skin allowing the cushion to re-inflate will minimize the leak.  If you struggle for a good fit, try turning the CPAP off and then readjust the mask by pulling it away from your face and then turning back on the CPAP.        Humidifier tips  Humidifiers can be adjusted to increase or decrease the amount of moisture according to your comfort level. You may need to adjust this frequently at first, but then might only change it with seasonal weather changes.     Try INCREASING the humidity if:  You experience a dry, irritated nasal passage or throat.  You have a runny, drippy nose or sneezing fits after using CPAP.  You experience nasal congestion during or after CPAP use.    Try DECREASING the humidity if:  You have excessive condensation or  rain out  in the tubing or mask.  Otherwise keep the tubing warm during the night by running it underneath the blankets or pillow.      Clinic visit after initial PAP set-up   Bring your equipment with you to your 5-8 week follow up clinic visit.  We will be extracting your data from the machine if not available from the cloud based Ple[employer].        Travel  Always take your equipment with you when you travel.  If you fly with your equipment bring it on with you as a carry on.  Medical equipment does not  count as a carry on.    If you travel international the machines take 110-240v.  The only adapter needed is the adapter that will fit into the receptacle (outlet).    You may also want to bring an extension cord as many hotel rooms have limited outlets at the bedside.  Do not travel with water in your humidifier chamber.     Cleaning and Maintenance Guidelines    Equipment Frequency Cleaning Method   Mask First Day    Daily      Weekly Soak mask in hot soapy water for 30 minutes, rinse and air dry.  Wipe nasal cushion with a hot soapy (Ivory, baby shampoo) cloth and rinse.  Baby wipes may also be used.  Do not use anti-bacterial soaps,Kristal  liquid soap, rubbing alcohol, bleach or ammonia.  Wash frame in hot soapy water (Ivory, baby shampoo) rinse and let air dry   Headgear Biweekly Wash in hot soapy water, rinse and air dry   Reusable Gray Filter Weekly Wash in hot soapy water, rinse, put in towel squeeze moisture out, let air dry   Disposable White Filter Check Weekly Replace when brown or gray in color; at least every 2 to 3 months   Humidifier Chamber Daily    Weekly Empty distilled water from humidifier and let air dry    Hand wash in hot soapy water, rinse and air dry   Tubing Weekly Wash in hot soapy water, rinse and let air dry   Mask, Tubing and Humidifier Chamber As needed Disinfect: Soak in 1 part distilled white vinegar to 3 parts hot water for 30 minutes, rinse well and air dry  Not the material headgear        MASK AND SUPPLY REORDERING and EQUIPMENT NEEDS through your DME and per your insurance  Reminder: Most insurance companies will allow for a new mask, headgear, tubing, and reusable gray filter every six months.  Disposable white ultra-fine filters are covered monthly.      HOME AND SAFETY INSTRUCTIONS  Do not use frayed or cracked electrical cords, multi plug adaptors, or switched receptacles  Do not immerse electrical equipment into water  Assure that electrical cords do not become a tripping  hazard

## 2023-02-03 NOTE — PROGRESS NOTES
[facility]  Sleep Study Follow-Up Visit:    Date on this visit: 2/3/2023    Primary Physician: Rohan Dooley     History of present illness:  Jan Campa is a 72 year old male patient with HTN, HLD, GERD, and obesity  who comes in today for follow-up of His sleep study done on 1/17/2023 at the [facility] for possible sleep apnea.    Sleep study showed Severe obstructive sleep apnea. Sleep related hypoxemia was present.  Additional findings from the sleep study can be discussed in more detail at your next visit/ showed abnormal findings including AHI 62.3, RDI 62.6, Supine AHI 74.5, REM AHI 58.3, Low O2 77.0%, Time Spent ?88% 53.5 minutes / Time Spent ?89% 80.6 minutes..    These findings were reviewed with patient.     SCALES:  EPWORTH SLEEPINESS SCALE    Seymour Sleepiness Scale ( GRICELDA Carlin  [id number]<br>ESS - [place]/English - Final version - 21 Nov 07 - [facility] [place].) 2/2/2023   Sitting and reading Would never doze   Watching TV High chance of dozing   Sitting, inactive in a public place (e.g. a theatre or a meeting) Would never doze   As a passenger in a car for an hour without a break Would never doze   Lying down to rest in the afternoon when circumstances permit High chance of dozing   Sitting and talking to someone Would never doze   Sitting quietly after a lunch without alcohol Slight chance of dozing   In a car, while stopped for a few minutes in traffic Would never doze   Seymour Score (MC) 7   Seymour Score (Sleep) 7       INSOMNIA SEVERITY INDEX (ALVAREZ)    Insomnia Severity Index (ALVAREZ) 2/2/2023   Difficulty falling asleep 1   Difficulty staying asleep 1   Problems waking up too early 0   How SATISFIED/DISSATISFIED are you with your CURRENT sleep pattern? 2   How NOTICEABLE to others do you think your sleep problem is in terms of impairing the quality of your life? 0   How WORRIED/DISTRESSED are you about your current sleep problem? 0   To what extent do  you consider your sleep problem to INTERFERE with your daily functioning (e.g. daytime fatigue, mood, ability to function at work/daily chores, concentration, memory, mood, etc.) CURRENTLY? 1   ALVAREZ Total Score 5     Guidelines for Scoring/Interpretation:  Total score categories:  0-7 = No clinically significant insomnia   8-14 = Subthreshold insomnia   15-21 = Clinical insomnia (moderate severity)  22-28 = Clinical insomnia (severe)  Used via courtesy of www.[employer].va.gov with permission from Bruce Loyola PhD., [facility]      Allergies:    Allergies   Allergen Reactions     Seasonal Allergies Dermatitis and Shortness Of Breath       Medications:    Current Outpatient Medications   Medication Sig Dispense Refill     hydrochlorothiazide (HYDRODIURIL) 12.5 MG tablet Take 1 tablet (12.5 mg) by mouth daily 30 tablet 3     lisinopril (ZESTRIL) 30 MG tablet Take 1 tablet (30 mg) by mouth daily 90 tablet 3     omega-3 fatty acids (FISH OIL CONCENTRATE ORAL) Take 2 capsules by mouth daily       petrolat,wht/min oil/sod chl (DRY EYES OPHT) [PETROLAT,WHT/MIN OIL/SOD CHL (DRY EYES OPHT)] Administer 1 drop to both eyes daily.         Problem List:  Patient Active Problem List    Diagnosis Date Noted     Obstructive sleep apnea 12/02/2022     Priority: Medium     1/17/2023 [facility] (262.0 lbs) - AHI 62.3, RDI 62.6, Supine AHI 74.5, REM AHI 58.3, Low O2 77.0%, Time Spent ?88% 53.5 minutes / Time Spent ?89% 80.6 minutes.       Hyperlipidemia LDL goal <130 10/12/2022     Priority: Medium     Morbid obesity (H) 10/12/2022     Priority: Medium     Chronic venous insufficiency 10/05/2022     Priority: Medium     Pes planus of both feet 10/05/2022     Priority: Medium     Lumbosacral spondylosis with radiculopathy 03/17/2021     Priority: Medium     Class 2 obesity due to excess calories without serious comorbidity in adult 03/17/2021     Priority: Medium     Spinal stenosis of lumbar region with  neurogenic claudication 03/17/2021     Priority: Medium     Snoring 03/17/2021     Priority: Medium     Essential hypertension      Priority: Medium     Created by Conversion  Replacement Utility updated for latest IMO load            Past Medical/Surgical History:  Past Medical History:   Diagnosis Date     Chronic venous insufficiency 10/05/2022     Essential hypertension      Hyperlipidemia LDL goal <130      Lumbosacral spondylosis with radiculopathy      Pes planus of both feet      Snoring 03/17/2021     Spinal stenosis of lumbar region with neurogenic claudication 03/17/2021     Ventral hernia      Past Surgical History:   Procedure Laterality Date     ABDOMINAL HERNIA REPAIR         Social History:  Social History     Socioeconomic History     Marital status:      Spouse name: Not on file     Number of children: Not on file     Years of education: Not on file     Highest education level: Not on file   Occupational History     Not on file   Tobacco Use     Smoking status: Never     Smokeless tobacco: Never   Vaping Use     Vaping Use: Never used   Substance and Sexual Activity     Alcohol use: Yes     Comment: Alcoholic Drinks/day: 2-3 beers weekly     Drug use: No     Sexual activity: Not on file   Other Topics Concern     Not on file   Social History Narrative    Likes walking and boating.  Three children.  Worked as .      Social Determinants of Health     Financial Resource Strain: Not on file   Food Insecurity: Not on file   Transportation Needs: Not on file   Physical Activity: Not on file   Stress: Not on file   Social Connections: Not on file   Intimate Partner Violence: Not on file   Housing Stability: Not on file       Family History:  Family History   Problem Relation Age of Onset     Breast Cancer Mother      Kidney Cancer Father      Prostate Cancer Father      Cancer Maternal Grandmother      Cancer Maternal Grandfather      Cancer Paternal Grandmother      Cancer Paternal  "Grandfather      Migraines Daughter        Review of systems  A complete review of systems reviewed by me is negative with the exeption of what has been mentioned in the history of present illness.    Physical Examination:  Vitals: BP (!) 160/99   Pulse 80   Ht 1.778 m (5' 10\")   Wt 116.9 kg (257 lb 12.8 oz)   SpO2 98%   BMI 36.99 kg/m    BMI= Body mass index is 36.99 kg/m .  GENERAL: Healthy, alert and no distress  EYES: Eyes grossly normal to inspection.  No discharge or erythema, or obvious scleral/conjunctival abnormalities.  RESP: No audible wheeze, cough, or visible cyanosis.  No visible retractions or increased work of breathing.    SKIN: Visible skin clear. No significant rash, abnormal pigmentation or lesions.  NEURO: Cranial nerves grossly intact.  Mentation and speech appropriate for age.  PSYCH: Mentation appears normal, affect normal/bright, judgement and insight intact, normal speech and appearance well-groomed.           Other tests/labs:   I have reviewed the labs and personally reviewed the imaging below and made my comment in the assessment and plan.    Assessment and Plan:  Problem List Items Addressed This Visit        Respiratory    Obstructive sleep apnea - Primary     Sleep study showed Severe obstructive sleep apnea. Sleep related hypoxemia was present.  Additional findings from the sleep study showed abnormal findings including AHI 62.3, Supine AHI 74.5, REM AHI 58.3, and Time spent </= 88% 53.5    Following discussion with patient a shared decision was made to begin therapy with auto-CPAP at 5-15 cmH2O.         Relevant Orders    COMPREHENSIVE DME (Completed)   Patient was strongly advised to avoid driving, operating any heavy machinery or other hazardous situations while drowsy or sleepy.  Patient was counseled on the importance of driving while alert, to pull over if drowsy, or nap before getting into the vehicle if sleepy.      Plan is for Jan Campa to follow up in about 3 " month.     The above note was dictated using voice recognition software. Although reviewed after completion, some word and grammatical error may remain . Please contact the author for any clarifications.    Total time spent reviewing medical records, history and physical examination, review of previous testing and interpretation as well as documentation on this date, 02/03/23: 30 minutes    Mahendra Collins MD on 2/3/2023   [facility]   Floor [address], [address]   [address] [place], [place] [address]. [place] [address]   Appointments: [phone]    CC: No ref. provider found

## 2023-02-03 NOTE — NURSING NOTE
"Chief Complaint   Patient presents with     Study Results     PSG 1/17/23       Initial BP (!) 160/99   Pulse 80   Ht 1.778 m (5' 10\")   Wt 116.9 kg (257 lb 12.8 oz)   SpO2 98%   BMI 36.99 kg/m   Estimated body mass index is 36.99 kg/m  as calculated from the following:    Height as of this encounter: 1.778 m (5' 10\").    Weight as of this encounter: 116.9 kg (257 lb 12.8 oz).    Medication Reconciliation: complete    Neck circumference:     DME: n/a    3 month follow up scheduled.     Kandy Hutchins MA  "

## 2023-03-23 DIAGNOSIS — I10 ESSENTIAL HYPERTENSION: ICD-10-CM

## 2023-03-24 RX ORDER — HYDROCHLOROTHIAZIDE 12.5 MG/1
TABLET ORAL
Qty: 90 TABLET | Refills: 3 | Status: SHIPPED | OUTPATIENT
Start: 2023-03-24 | End: 2024-03-11

## 2023-03-24 NOTE — TELEPHONE ENCOUNTER
"Routing refill request to provider for review/approval because:  BP, early request    Last Written Prescription Date:  12/23/22  Last Fill Quantity: 30,  # refills: 3   Last office visit provider:   11/3/22 with david    Requested Prescriptions   Pending Prescriptions Disp Refills     hydrochlorothiazide (HYDRODIURIL) 12.5 MG tablet [Pharmacy Med Name: HYDROCHLOROTHIAZIDE 12.5 MG TB] 90 tablet 1     Sig: TAKE 1 TABLET BY MOUTH EVERY DAY       Diuretics (Including Combos) Protocol Failed - 3/23/2023  3:46 PM        Failed - Blood pressure under 140/90 in past 12 months     BP Readings from Last 3 Encounters:   02/03/23 (!) 160/99   12/02/22 127/77   10/05/22 (!) 152/91                 Passed - Recent (12 mo) or future (30 days) visit within the authorizing provider's specialty     Patient has had an office visit with the authorizing provider or a provider within the authorizing providers department within the previous 12 mos or has a future within next 30 days. See \"Patient Info\" tab in inbasket, or \"Choose Columns\" in Meds & Orders section of the refill encounter.              Passed - Medication is active on med list        Passed - Patient is age 18 or older        Passed - Normal serum creatinine on file in past 12 months     Recent Labs   Lab Test 09/08/22  1042   CR 0.93              Passed - Normal serum potassium on file in past 12 months     Recent Labs   Lab Test 09/08/22  1042   POTASSIUM 5.0                    Passed - Normal serum sodium on file in past 12 months     Recent Labs   Lab Test 09/08/22  1042                      EVANGELISTA ARMAS RN 03/24/23 1:58 PM  "

## 2023-03-27 ENCOUNTER — TELEPHONE (OUTPATIENT)
Dept: SLEEP MEDICINE | Facility: CLINIC | Age: 73
End: 2023-03-27
Payer: MEDICARE

## 2023-03-27 NOTE — TELEPHONE ENCOUNTER
PT WAS TRANSFERRED TO ME AND SCHEDULED IN [facility] FOR 4/19/23 AS WE DONT HAVE ANY SOONER APPTS.    Ron Greer, Respiratory DME Coordinator

## 2023-04-19 ENCOUNTER — DOCUMENTATION ONLY (OUTPATIENT)
Dept: SLEEP MEDICINE | Facility: CLINIC | Age: 73
End: 2023-04-19
Payer: MEDICARE

## 2023-04-19 DIAGNOSIS — G47.33 OBSTRUCTIVE SLEEP APNEA (ADULT) (PEDIATRIC): Primary | ICD-10-CM

## 2023-04-19 NOTE — PROGRESS NOTES
Patient was offered [facility]  Patient Jan Campa was set up at [facility] on April 19, 2023. Patient received a Resmed Airsense 11 Pressures were set at 5-15 cm H2O.   Patient s ramp is 5 cm H2O for Auto and FLEX/EPR is EPR, 2.  Patient received a Resmed Mask name: [id number]  Full Face mask size Medium, heated tubing and heated humidifier.  Patient has the following compliance requirements: using and visit requirements  Patient will schedule with PCP.  Sam O Humes

## 2023-04-24 ENCOUNTER — DOCUMENTATION ONLY (OUTPATIENT)
Dept: SLEEP MEDICINE | Facility: CLINIC | Age: 73
End: 2023-04-24
Payer: MEDICARE

## 2023-04-24 NOTE — PROGRESS NOTES
3 day Sleep therapy management telephone visit    Diagnostic AHI: 62.3  PSG    Confirmed with patient at time of call- N/A Patient is still interested in STM service       Patient voicemail is full and was unable to leave message.          Objective data     Order Settings for PAP  CPAP min     CPAP max              Device settings from machine CPAP min 5.0     CPAP max 15.0           EPR Setting TWO    RESMED soft response  OFF     Assessment: Nightly usage over four hours      Action plan: Patient to have 14 day STM visit. Patient has a follow up visit scheduled:   no    Replacement device: No  STM ordered by provider: Yes     Total time spent on accessing and  interpreting remote patient PAP therapy data  10 minutes    Total time spent counseling, coaching  and reviewing PAP therapy data with patient  1 minutes    [id number] no

## 2023-05-10 ENCOUNTER — DOCUMENTATION ONLY (OUTPATIENT)
Dept: SLEEP MEDICINE | Facility: CLINIC | Age: 73
End: 2023-05-10
Payer: MEDICARE

## 2023-05-10 NOTE — PROGRESS NOTES
14  DAY STM VISIT    Diagnostic AHI: 62.3  PSG    Message left for patient to return call     Assessment: Pt meeting objective benchmarks     Action plan: waiting for patient to return call.  and pt to have 30 day STM visit.      Device type: Auto-CPAP    PAP settings:  CPAP MIN CPAP MAX 95TH % PRESSURE EPR RESMED SOFT RESPONSE SETTING   5.0 cm  H20 15.0 cm  H20 14.8 cm  H20  TWO OFF     Mask type:  Full Face Mask    Objective measures: 14 day rolling measures   COMPLIANCE LEAK AHI AVERAGE USE IN MINUTES   100 % 16 4.09 346   GOAL >70% GOAL < 24 LPM GOAL <5 GOAL >240          Total time spent on accessing and interpreting remote patient PAP therapy data  10 minutes    Total time spent counseling, coaching  and reviewing PAP therapy data with patient  1 minute    [id number]  [id number]  no (3 day STM)

## 2023-07-16 ASSESSMENT — SLEEP AND FATIGUE QUESTIONNAIRES
HOW LIKELY ARE YOU TO NOD OFF OR FALL ASLEEP IN A CAR, WHILE STOPPED FOR A FEW MINUTES IN TRAFFIC: WOULD NEVER DOZE
HOW LIKELY ARE YOU TO NOD OFF OR FALL ASLEEP WHILE SITTING INACTIVE IN A PUBLIC PLACE: WOULD NEVER DOZE
HOW LIKELY ARE YOU TO NOD OFF OR FALL ASLEEP WHILE WATCHING TV: HIGH CHANCE OF DOZING
HOW LIKELY ARE YOU TO NOD OFF OR FALL ASLEEP WHILE SITTING AND READING: SLIGHT CHANCE OF DOZING
HOW LIKELY ARE YOU TO NOD OFF OR FALL ASLEEP WHILE SITTING QUIETLY AFTER LUNCH WITHOUT ALCOHOL: WOULD NEVER DOZE
HOW LIKELY ARE YOU TO NOD OFF OR FALL ASLEEP WHEN YOU ARE A PASSENGER IN A CAR FOR AN HOUR WITHOUT A BREAK: WOULD NEVER DOZE
HOW LIKELY ARE YOU TO NOD OFF OR FALL ASLEEP WHILE LYING DOWN TO REST IN THE AFTERNOON WHEN CIRCUMSTANCES PERMIT: SLIGHT CHANCE OF DOZING
HOW LIKELY ARE YOU TO NOD OFF OR FALL ASLEEP WHILE SITTING AND TALKING TO SOMEONE: WOULD NEVER DOZE

## 2023-07-17 ENCOUNTER — OFFICE VISIT (OUTPATIENT)
Dept: SLEEP MEDICINE | Facility: CLINIC | Age: 73
End: 2023-07-17
Payer: MEDICARE

## 2023-07-17 VITALS
WEIGHT: 261.6 LBS | HEIGHT: 70 IN | OXYGEN SATURATION: 96 % | BODY MASS INDEX: 37.45 KG/M2 | DIASTOLIC BLOOD PRESSURE: 87 MMHG | SYSTOLIC BLOOD PRESSURE: 136 MMHG | HEART RATE: 75 BPM

## 2023-07-17 DIAGNOSIS — G47.33 OSA ON CPAP: Primary | ICD-10-CM

## 2023-07-17 PROCEDURE — 99214 OFFICE O/P EST MOD 30 MIN: CPT | Performed by: STUDENT IN AN ORGANIZED HEALTH CARE EDUCATION/TRAINING PROGRAM

## 2023-07-17 RX ORDER — CETIRIZINE HYDROCHLORIDE 10 MG/1
TABLET ORAL
COMMUNITY
Start: 2023-05-01

## 2023-07-17 NOTE — NURSING NOTE
"Chief Complaint   Patient presents with     CPAP Follow Up       Initial /87   Pulse 75   Ht 1.778 m (5' 10\")   Wt 118.7 kg (261 lb 9.6 oz)   SpO2 96%   BMI 37.54 kg/m   Estimated body mass index is 37.54 kg/m  as calculated from the following:    Height as of this encounter: 1.778 m (5' 10\").    Weight as of this encounter: 118.7 kg (261 lb 9.6 oz).    Medication Reconciliation: complete    Neck circumference:     DME: [facility]     Future MyChart message sent reminding patient of 1 year follow up appointment.     Kandy Hutchins MA    "

## 2023-07-17 NOTE — ASSESSMENT & PLAN NOTE
Jan Campa has Severe Sleep apnea. He is tolerating PAP well and reports adequate compliance with PAP therapy. Daytime symptoms are improved and reports positive benefits with PAP use.   JACOB is adequately controlled with Auto CPAP at the current settings per compliance DL.     Prescription provided for renewal of PAP supplies.    Prescription provided to change CPAP settings to 7-17 cm H2O from 5-15 cmH2O.    Recommended him/her to continue using the CPAP regularly during sleep and instructed  to get  the supplies for the PAP replaced regularly.      Patient instructed to remember to bring PAP with him/her if hospitalized and if anticipating procedure that requires sedation/surgery to be sure to discuss with the provider/surgeon that he/she has sleep apnea and uses PAP therapy.

## 2023-07-17 NOTE — PATIENT INSTRUCTIONS
MY INFORMATION ON SLEEP APNEA-  Jan Campa    DOCTOR : Mahendra Collins MD  SLEEP CENTER :      MY CONTACT NUMBER:    [facility]  [phone]  [facility]      For general sleep health questions:   http://sleepeducation.org    For tips about PAP and COVID-19:  [email or web address]://[email or web address].org/patients/patient-resources/resources/covid-19-and-home-pap-therapy.pdf    For general info about COVID-19 including vaccines:  [email or web address]      Continue PAP therapy every night, for all hours that you are sleeping (including naps.)  As always, try to get at least 8 hours of sleep or more each day, keep a regular sleep schedule, and avoid sleep deprivation. Avoid alcohol.  Reasons that you might need a change to your pressure therapy would be weight gain or loss, waking having inadvertently removed your PAP overnight, having previously felt refreshed by sleep with CPAP use and now waking un-refreshed, and return of daytime sleepiness. Also, the development of new medical problems  (such as heart failure, stroke, medications such as narcotics) can sometimes affect breathing at night and change your PAP therapy needs.  Please bring PAP with you if you are hospitalized.  If anticipating surgery be sure to discuss with your surgeon that you have sleep apnea and use PAP therapy.    Maintain your equipment as recommended which includes routine cleaning and replacement of supplies.      Call DME for any questions regarding supplies or maintenance.    [facility], [facility] [phone]    Do not drive on engage in potentially dangerous activities if feeling sleepy.  Please follow up in sleep clinic again in 12 months.        Tips for your PAP use-    Mask fitting tips  Mask fitting exercise:    To improve your mask seal and your mobility at night, put mask on and secure in place.  Lie down in bed with full pressure and  roll to one side, adjust headgear while in that position to eliminate any leaks. Repeat process rolling to other side.     The mask seal does not have to be perfect:   CPAP machines are designed to make up for small leaks. However, you will not tolerate leaks blowing in your eyes so you will need to adjust.   Any leak should only be near or at the bottom of the mask.  We expect your mask to leak slightly at night.    Do not over-tighten the headgear straps, tighter IS NOT better, we expect minimal leak.    First try re-positioning the mask or headgear before tightening the headgear straps.  Mask leaks are expected due to changing sleeping positions. Try pulling the mask away from your skin allowing the cushion to re-inflate will minimize the leak.  If you struggle for a good fit, try turning the CPAP off and then readjust the mask by pulling it away from your face and then turning back on the CPAP.        Humidifier tips  Humidifiers can be adjusted to increase or decrease the amount of moisture according to your comfort level. You may need to adjust this frequently at first, but then might only change it with seasonal weather changes.     Try INCREASING the humidity if:  You experience a dry, irritated nasal passage or throat.  You have a runny, drippy nose or sneezing fits after using CPAP.  You experience nasal congestion during or after CPAP use.    Try DECREASING the humidity if:  You have excessive condensation or  rain out  in the tubing or mask.  Otherwise keep the tubing warm during the night by running it underneath the blankets or pillow.      Clinic visit after initial PAP set-up   Bring your equipment with you to your 5-8 week follow up clinic visit.  We will be extracting your data from the machine if not available from the cloud based [employer].        Travel  Always take your equipment with you when you travel.  If you fly with your equipment bring it on with you as a carry on.  Medical equipment does  not count as a carry on.    If you travel international the machines take 110-240v.  The only adapter needed is the adapter that will fit into the receptacle (outlet).    You may also want to bring an extension cord as many hotel rooms have limited outlets at the bedside.  Do not travel with water in your humidifier chamber.     Cleaning and Maintenance Guidelines    Equipment Frequency Cleaning Method   Mask First Day    Daily      Weekly Soak mask in hot soapy water for 30 minutes, rinse and air dry.  Wipe nasal cushion with a hot soapy (Ivory, baby shampoo) cloth and rinse.  Baby wipes may also be used.  Do not use anti-bacterial soaps,Kristal  liquid soap, rubbing alcohol, bleach or ammonia.  Wash frame in hot soapy water (Ivory, baby shampoo) rinse and let air dry   Headgear Biweekly Wash in hot soapy water, rinse and air dry   Reusable Gray Filter Weekly Wash in hot soapy water, rinse, put in towel squeeze moisture out, let air dry   Disposable White Filter Check Weekly Replace when brown or gray in color; at least every 2 to 3 months   Humidifier Chamber Daily    Weekly Empty distilled water from humidifier and let air dry    Hand wash in hot soapy water, rinse and air dry   Tubing Weekly Wash in hot soapy water, rinse and let air dry   Mask, Tubing and Humidifier Chamber As needed Disinfect: Soak in 1 part distilled white vinegar to 3 parts hot water for 30 minutes, rinse well and air dry  Not the material headgear        MASK AND SUPPLY REORDERING and EQUIPMENT NEEDS through your DME and per your insurance  Reminder: Most insurance companies will allow for a new mask, headgear, tubing, and reusable gray filter every six months.  Disposable white ultra-fine filters are covered monthly.      HOME AND SAFETY INSTRUCTIONS  Do not use frayed or cracked electrical cords, multi plug adaptors, or switched receptacles  Do not immerse electrical equipment into water  Assure that electrical cords do not become a tripping  hazard

## 2023-07-17 NOTE — PROGRESS NOTES
[facility]     Sleep clinic follow up visit note    Date on this visit: 7/17/2023    Primary Physician: Rohan Dooley     History of present illness:  Jan Campa is a 72 year old male patient with HTN, HLD, GERD, and obesity who presents for CPAP Follow Up  . He has severe sleep apnea with an AHI of 62.3, managed with CPAP.     Do you use a CPAP Machine at home: Yes  DME: [facility]  Overall, on a scale of 0-10 how would you rate your CPAP (0 poor, 10 great): 7    What type of mask do you use: Full Face Mask  Is your mask comfortable: Yes  If not, why:      Is your mask leaking: Yes  If yes, where do you feel it: along my cheeks  How many night per week does the mask leak (0-7): 2-3    Do you notice snoring with mask on: No  Do you notice gasping arousals with mask on: No  Are you having significant oral or nasal dryness: Yes  Is the pressure setting comfortable: Yes  If not, why:      What is your typical bedtime: 1:30 am  How long does it take you to go to sleep on PAP therapy: 45 minutes  What time do you typically get out of bed for the day: 8:30 am  How many hours on average per night are you using PAP therapy: 6.5  How many hours are you sleeping per night: 5.5  Do you feel well rested in the morning: Yes    ResMed   Auto-PAP 5.0 - 15.0 cmH2O 30 day usage data:    100% of days with > 4 hours of use. 0/30 days with no use.   Average use 384 minutes per day.   95%ile Leak 12.04 L/min.   CPAP 95% pressure 14.5 cm.   AHI 3.26 events per hour.          SCALES:  EPWORTH SLEEPINESS SCALE       7/16/2023    11:46 AM    [place] Sleepiness Scale ( [place]se Carlin  1990-1997[facility] [place]/English - Final version - 21 Nov 07 - [employer] [place].)   Sitting and reading Slight chance of dozing   Watching TV High chance of dozing   Sitting, inactive in a public place (e.g. a theatre or a meeting) Would never doze   As a passenger in a car for an hour without a break Would never doze   Lying down to rest in the  afternoon when circumstances permit Slight chance of dozing   Sitting and talking to someone Would never doze   Sitting quietly after a lunch without alcohol Would never doze   In a car, while stopped for a few minutes in traffic Would never doze   Denniston Score (MC) 5   Denniston Score (Sleep) 5       INSOMNIA SEVERITY INDEX (ALVAREZ)        7/16/2023    11:29 AM   Insomnia Severity Index (ALVAREZ)   Difficulty falling asleep 1   Difficulty staying asleep 1   Problems waking up too early 0   How SATISFIED/DISSATISFIED are you with your CURRENT sleep pattern? 2   How NOTICEABLE to others do you think your sleep problem is in terms of impairing the quality of your life? 0   How WORRIED/DISTRESSED are you about your current sleep problem? 0   To what extent do you consider your sleep problem to INTERFERE with your daily functioning (e.g. daytime fatigue, mood, ability to function at work/daily chores, concentration, memory, mood, etc.) CURRENTLY? 1   ALVAREZ Total Score 5     Guidelines for Scoring/Interpretation:  Total score categories:  0-7 = No clinically significant insomnia   8-14 = Subthreshold insomnia   15-21 = Clinical insomnia (moderate severity)  22-28 = Clinical insomnia (severe)  Used via courtesy of www.[employer].va.gov with permission from Bruce Loyola PhD., [facility]      Allergies:    Allergies   Allergen Reactions     Seasonal Allergies Dermatitis and Shortness Of Breath       Medications:    Current Outpatient Medications   Medication Sig Dispense Refill     cetirizine (ZYRTEC) 10 MG tablet        hydrochlorothiazide (HYDRODIURIL) 12.5 MG tablet TAKE 1 TABLET BY MOUTH EVERY DAY 90 tablet 3     lisinopril (ZESTRIL) 30 MG tablet Take 1 tablet (30 mg) by mouth daily 90 tablet 3     omega-3 fatty acids (FISH OIL CONCENTRATE ORAL) Take 2 capsules by mouth daily       petrolat,wht/min oil/sod chl (DRY EYES OPHT) [PETROLAT,WHT/MIN OIL/SOD CHL (DRY EYES OPHT)] Administer 1 drop to both eyes daily.          Problem List:  Patient Active Problem List    Diagnosis Date Noted     JACOB on CPAP 12/02/2022     Priority: Medium     1/17/2023 [facility] (262.0 lbs) - AHI 62.3, RDI 62.6, Supine AHI 74.5, REM AHI 58.3, Low O2 77.0%, Time Spent ?88% 53.5 minutes / Time Spent ?89% 80.6 minutes.       Hyperlipidemia LDL goal <130 10/12/2022     Priority: Medium     Morbid obesity (H) 10/12/2022     Priority: Medium     Chronic venous insufficiency 10/05/2022     Priority: Medium     Pes planus of both feet 10/05/2022     Priority: Medium     Lumbosacral spondylosis with radiculopathy 03/17/2021     Priority: Medium     Class 2 obesity due to excess calories without serious comorbidity in adult 03/17/2021     Priority: Medium     Spinal stenosis of lumbar region with neurogenic claudication 03/17/2021     Priority: Medium     Snoring 03/17/2021     Priority: Medium     Essential hypertension      Priority: Medium     Created by Conversion  Replacement Utility updated for latest IMO load            Past Medical/Surgical History:  Past Medical History:   Diagnosis Date     Chronic venous insufficiency 10/05/2022     Essential hypertension      Hyperlipidemia LDL goal <130      Lumbosacral spondylosis with radiculopathy      Pes planus of both feet      Snoring 03/17/2021     Spinal stenosis of lumbar region with neurogenic claudication 03/17/2021     Ventral hernia      Past Surgical History:   Procedure Laterality Date     ABDOMINAL HERNIA REPAIR         Social History:  Social History     Socioeconomic History     Marital status:      Spouse name: Not on file     Number of children: Not on file     Years of education: Not on file     Highest education level: Not on file   Occupational History     Not on file   Tobacco Use     Smoking status: Never     Smokeless tobacco: Never   Vaping Use     Vaping Use: Never used   Substance and Sexual Activity     Alcohol use: Yes     Comment: Alcoholic  "Drinks/day: 2-3 beers weekly     Drug use: No     Sexual activity: Not on file   Other Topics Concern     Not on file   Social History Narrative    Likes walking and boating.  Three children.  Worked as .      Social Determinants of Health     Financial Resource Strain: Not on file   Food Insecurity: Not on file   Transportation Needs: Not on file   Physical Activity: Not on file   Stress: Not on file   Social Connections: Not on file   Intimate Partner Violence: Not on file   Housing Stability: Not on file       Family History:  Family History   Problem Relation Age of Onset     Breast Cancer Mother      Kidney Cancer Father      Prostate Cancer Father      Cancer Maternal Grandmother      Cancer Maternal Grandfather      Cancer Paternal Grandmother      Cancer Paternal Grandfather      Migraines Daughter        Review of systems  A complete review of systems reviewed by me is negative with the exeption of what has been mentioned in the history of present illness.    Physical Examination:  Vitals: /87   Pulse 75   Ht 1.778 m (5' 10\")   Wt 118.7 kg (261 lb 9.6 oz)   SpO2 96%   BMI 37.54 kg/m    BMI= Body mass index is 37.54 kg/m .     GENERAL: Healthy, alert and no distress  EYES: Eyes grossly normal to inspection.  No discharge or erythema, or obvious scleral/conjunctival abnormalities.  RESP: No audible wheeze, cough, or visible cyanosis.  No visible retractions or increased work of breathing.    SKIN: Visible skin clear. No significant rash, abnormal pigmentation or lesions.  NEURO: Cranial nerves grossly intact.  Mentation and speech appropriate for age.  PSYCH: Mentation appears normal, affect normal/bright, judgement and insight intact, normal speech and appearance well-groomed.          Other tests/labs:   I have reviewed the labs and personally reviewed the imaging below and made my comment in the assessment and plan.    Assessment and Plan:    Problem List Items Addressed This Visit     "    Respiratory    JACOB on CPAP - Primary     Jan Campa has Severe Sleep apnea. He is tolerating PAP well and reports adequate compliance with PAP therapy. Daytime symptoms are improved and reports positive benefits with PAP use.   JACOB is adequately controlled with Auto CPAP at the current settings per compliance DL.     Prescription provided for renewal of PAP supplies.    Prescription provided to change CPAP settings to 7-17 cm H2O from 5-15 cmH2O.    Recommended him/her to continue using the CPAP regularly during sleep and instructed  to get  the supplies for the PAP replaced regularly.      Patient instructed to remember to bring PAP with him/her if hospitalized and if anticipating procedure that requires sedation/surgery to be sure to discuss with the provider/surgeon that he/she has sleep apnea and uses PAP therapy.           Relevant Orders    COMPREHENSIVE DME (Completed)     Patient was strongly advised to avoid driving, operating any heavy machinery or other hazardous situations while drowsy or sleepy.  Patient was counseled on the importance of driving while alert, to pull over if drowsy, or nap before getting into the vehicle if sleepy.      Plan is for Jan Campa to follow up in about 12 month(s).     The above note was dictated using voice recognition software. Although reviewed after completion, some word and grammatical error may remain . Please contact the author for any clarifications.    Total time spent reviewing medical records, history and physical examination, review of previous testing and interpretation as well as documentation on this date, 07/17/23: 30 minutes    Mahendra Collins MD on 10/20/2022   [facility]   Floor 1, [address]   [address] [place], [place] [address]. [place] [address]   Appointments: [phone] [place] [address]   [address]oor [address], [address]   [address] [place]  [address]   Appointments: [phone]     CC: No ref. provider found

## 2023-09-05 ENCOUNTER — PATIENT OUTREACH (OUTPATIENT)
Dept: CARE COORDINATION | Facility: CLINIC | Age: 73
End: 2023-09-05
Payer: MEDICARE

## 2023-09-11 DIAGNOSIS — I10 ESSENTIAL HYPERTENSION: ICD-10-CM

## 2023-09-12 RX ORDER — LISINOPRIL 30 MG/1
30 TABLET ORAL DAILY
Qty: 90 TABLET | Refills: 3 | Status: SHIPPED | OUTPATIENT
Start: 2023-09-12 | End: 2024-09-04

## 2023-09-18 DIAGNOSIS — I10 ESSENTIAL HYPERTENSION: ICD-10-CM

## 2023-09-18 NOTE — TELEPHONE ENCOUNTER
Pending Prescriptions:                       Disp   Refills    lisinopril (ZESTRIL) 30 MG tablet         90 tab*3            Sig: Take 1 tablet (30 mg) by mouth daily

## 2023-09-19 ENCOUNTER — PATIENT OUTREACH (OUTPATIENT)
Dept: CARE COORDINATION | Facility: CLINIC | Age: 73
End: 2023-09-19
Payer: MEDICARE

## 2023-09-19 RX ORDER — LISINOPRIL 30 MG/1
30 TABLET ORAL DAILY
Qty: 90 TABLET | Refills: 3 | OUTPATIENT
Start: 2023-09-19

## 2023-09-19 NOTE — TELEPHONE ENCOUNTER
Script sent to the pharmacy on 9/12/23 for 90 tablets with 3 additional refills on file.     Elis Valles RN

## 2023-10-17 ENCOUNTER — TRANSFERRED RECORDS (OUTPATIENT)
Dept: HEALTH INFORMATION MANAGEMENT | Facility: CLINIC | Age: 73
End: 2023-10-17
Payer: MEDICARE

## 2023-11-29 ASSESSMENT — ENCOUNTER SYMPTOMS
CHILLS: 0
HEARTBURN: 0
JOINT SWELLING: 0
DIZZINESS: 0
HEADACHES: 0
COUGH: 0
HEMATOCHEZIA: 0
NAUSEA: 0
FEVER: 0
HEMATURIA: 0
NERVOUS/ANXIOUS: 0
PARESTHESIAS: 0
SHORTNESS OF BREATH: 1
FREQUENCY: 1
ABDOMINAL PAIN: 0
WEAKNESS: 0
SORE THROAT: 0
ARTHRALGIAS: 0
EYE PAIN: 0
DIARRHEA: 0
CONSTIPATION: 0
MYALGIAS: 0
DYSURIA: 0
PALPITATIONS: 0

## 2023-11-29 ASSESSMENT — ACTIVITIES OF DAILY LIVING (ADL): CURRENT_FUNCTION: NO ASSISTANCE NEEDED

## 2023-11-30 ENCOUNTER — OFFICE VISIT (OUTPATIENT)
Dept: INTERNAL MEDICINE | Facility: CLINIC | Age: 73
End: 2023-11-30
Payer: MEDICARE

## 2023-11-30 VITALS
DIASTOLIC BLOOD PRESSURE: 86 MMHG | BODY MASS INDEX: 36.82 KG/M2 | TEMPERATURE: 98.1 F | SYSTOLIC BLOOD PRESSURE: 134 MMHG | HEART RATE: 76 BPM | RESPIRATION RATE: 16 BRPM | WEIGHT: 257.2 LBS | OXYGEN SATURATION: 99 % | HEIGHT: 70 IN

## 2023-11-30 DIAGNOSIS — I87.2 CHRONIC VENOUS INSUFFICIENCY: ICD-10-CM

## 2023-11-30 DIAGNOSIS — Z11.59 NEED FOR HEPATITIS C SCREENING TEST: Primary | ICD-10-CM

## 2023-11-30 DIAGNOSIS — E66.812 CLASS 2 OBESITY DUE TO EXCESS CALORIES WITHOUT SERIOUS COMORBIDITY WITH BODY MASS INDEX (BMI) OF 36.0 TO 36.9 IN ADULT: ICD-10-CM

## 2023-11-30 DIAGNOSIS — Z12.11 SCREENING FOR COLON CANCER: ICD-10-CM

## 2023-11-30 DIAGNOSIS — I10 ESSENTIAL HYPERTENSION: ICD-10-CM

## 2023-11-30 DIAGNOSIS — E66.09 CLASS 2 OBESITY DUE TO EXCESS CALORIES WITHOUT SERIOUS COMORBIDITY WITH BODY MASS INDEX (BMI) OF 36.0 TO 36.9 IN ADULT: ICD-10-CM

## 2023-11-30 DIAGNOSIS — Z29.11 NEED FOR VACCINATION AGAINST RESPIRATORY SYNCYTIAL VIRUS: ICD-10-CM

## 2023-11-30 DIAGNOSIS — Z23 NEED FOR TDAP VACCINATION: ICD-10-CM

## 2023-11-30 DIAGNOSIS — E66.01 MORBID OBESITY (H): ICD-10-CM

## 2023-11-30 DIAGNOSIS — G47.33 OSA ON CPAP: ICD-10-CM

## 2023-11-30 DIAGNOSIS — Z12.5 SCREENING FOR PROSTATE CANCER: ICD-10-CM

## 2023-11-30 DIAGNOSIS — Z00.00 ENCOUNTER FOR MEDICARE ANNUAL WELLNESS EXAM: ICD-10-CM

## 2023-11-30 DIAGNOSIS — E78.5 HYPERLIPIDEMIA LDL GOAL <130: ICD-10-CM

## 2023-11-30 LAB
ERYTHROCYTE [DISTWIDTH] IN BLOOD BY AUTOMATED COUNT: 14 % (ref 10–15)
HCT VFR BLD AUTO: 50.6 % (ref 40–53)
HGB BLD-MCNC: 16.2 G/DL (ref 13.3–17.7)
MCH RBC QN AUTO: 28.1 PG (ref 26.5–33)
MCHC RBC AUTO-ENTMCNC: 32 G/DL (ref 31.5–36.5)
MCV RBC AUTO: 88 FL (ref 78–100)
PLATELET # BLD AUTO: 223 10E3/UL (ref 150–450)
RBC # BLD AUTO: 5.77 10E6/UL (ref 4.4–5.9)
WBC # BLD AUTO: 7.5 10E3/UL (ref 4–11)

## 2023-11-30 PROCEDURE — 86803 HEPATITIS C AB TEST: CPT | Performed by: INTERNAL MEDICINE

## 2023-11-30 PROCEDURE — 80061 LIPID PANEL: CPT | Performed by: INTERNAL MEDICINE

## 2023-11-30 PROCEDURE — 80053 COMPREHEN METABOLIC PANEL: CPT | Performed by: INTERNAL MEDICINE

## 2023-11-30 PROCEDURE — G0439 PPPS, SUBSEQ VISIT: HCPCS | Performed by: INTERNAL MEDICINE

## 2023-11-30 PROCEDURE — 85027 COMPLETE CBC AUTOMATED: CPT | Performed by: INTERNAL MEDICINE

## 2023-11-30 PROCEDURE — 99214 OFFICE O/P EST MOD 30 MIN: CPT | Mod: 25 | Performed by: INTERNAL MEDICINE

## 2023-11-30 PROCEDURE — G0103 PSA SCREENING: HCPCS | Performed by: INTERNAL MEDICINE

## 2023-11-30 PROCEDURE — 36415 COLL VENOUS BLD VENIPUNCTURE: CPT | Performed by: INTERNAL MEDICINE

## 2023-11-30 RX ORDER — RESPIRATORY SYNCYTIAL VIRUS VACCINE 120MCG/0.5
0.5 KIT INTRAMUSCULAR ONCE
Qty: 1 EACH | Refills: 0 | Status: CANCELLED | OUTPATIENT
Start: 2023-11-30 | End: 2023-11-30

## 2023-11-30 ASSESSMENT — ENCOUNTER SYMPTOMS
NERVOUS/ANXIOUS: 0
PALPITATIONS: 0
PARESTHESIAS: 0
MYALGIAS: 0
FREQUENCY: 1
ARTHRALGIAS: 0
HEADACHES: 0
DYSURIA: 0
COUGH: 0
SORE THROAT: 0
ABDOMINAL PAIN: 0
HEARTBURN: 0
NAUSEA: 0
SHORTNESS OF BREATH: 1
CONSTIPATION: 0
FEVER: 0
EYE PAIN: 0
DIZZINESS: 0
HEMATURIA: 0
JOINT SWELLING: 0
CHILLS: 0
DIARRHEA: 0
WEAKNESS: 0
HEMATOCHEZIA: 0

## 2023-11-30 ASSESSMENT — ACTIVITIES OF DAILY LIVING (ADL): CURRENT_FUNCTION: NO ASSISTANCE NEEDED

## 2023-11-30 NOTE — PROGRESS NOTES
"SUBJECTIVE:   Erik is a 73 year old, presenting for the following:    HPI:  he has been well.  Weight is stable.  Wife is having health issues, which is stressful. She developed polymyositis, and now interstitial pulmonary disease following Covid vaccination.  He is using CPAP with good results.  His LE edema has not increased and no ulceration or inflammation or LE pain.  No unusual dyspnea or cough, or bowel or bladder issues.          11/30/2023    12:22 PM   Additional Questions   Roomed by Chasity   Accompanied by N/A         11/30/2023    12:22 PM   Patient Reported Additional Medications   Patient reports taking the following new medications N/A     Are you in the first 12 months of your Medicare coverage?  No    Healthy Habits:     In general, how would you rate your overall health?  Good    Frequency of exercise:  4-5 days/week    Duration of exercise:  15-30 minutes    Do you usually eat at least 4 servings of fruit and vegetables a day, include whole grains    & fiber and avoid regularly eating high fat or \"junk\" foods?  Yes    Taking medications regularly:  Yes    Medication side effects:  None    Ability to successfully perform activities of daily living:  No assistance needed    Home Safety:  No safety concerns identified    Hearing Impairment:  Difficulty following a conversation in a noisy restaurant or crowded room    In the past 6 months, have you been bothered by leaking of urine?  No    In general, how would you rate your overall mental or emotional health?  Excellent    Additional concerns today:  No    Today's PHQ-2 Score:       11/29/2023    10:21 PM   PHQ-2 ( 1999 [employer])   Q1: Little interest or pleasure in doing things 0   Q2: Feeling down, depressed or hopeless 0   PHQ-2 Score 0   Q1: Little interest or pleasure in doing things Not at all   Q2: Feeling down, depressed or hopeless Not at all   PHQ-2 Score 0     Have you ever done Advance Care Planning? (For example, a Health Directive, POLST, or " a discussion with a medical provider or your loved ones about your wishes): No, advance care planning information given to patient to review.  Patient declined advance care planning discussion at this time.    Hearing:  intact to conversation today.    Fall risk  Fallen 2 or more times in the past year?: No  Any fall with injury in the past year?: No    Cognitive Screening   1) Repeat 3 items (Leader, Season, Table)    2) Clock draw: , and NORMAL  3) 3 item recall: Recalls 3 objects  Results: 3 items recalled: COGNITIVE IMPAIRMENT LESS LIKELY    Mini-CogTM Copyright S Kathrine. Licensed by the author for use in [facility]; reprinted with permission (mahogany@[facility]). All rights reserved.      Reviewed and updated as needed this visit by clinical staff    Reviewed and updated as needed this visit by Provider     Social History     Tobacco Use    Smoking status: Never    Smokeless tobacco: Never   Substance Use Topics    Alcohol use: Yes     Comment: Alcoholic Drinks/day: 2-3 beers weekly         11/29/2023    10:20 PM   Alcohol Use   Prescreen: >3 drinks/day or >7 drinks/week? No     Do you have a current opioid prescription? No  Do you use any other controlled substances or medications that are not prescribed by a provider? Alcohol and Prescription Drugs    Current providers sharing in care for this patient include:   Patient Care Team:  Rhoan Dooley MD as PCP - General (Internal Medicine)  Poly Molina MD as MD (Otolaryngology)  Mahendra Collins MD as Assigned PCP    The following health maintenance items are reviewed in Epic and correct as of today:  Health Maintenance   Topic Date Due    ADVANCE CARE PLANNING  Never done    HEPATITIS C SCREENING  Never done    RSV VACCINE (Pregnancy & 60+) (1 - 1-dose 60+ series) Never done    AORTIC ANEURYSM SCREENING (SYSTEM ASSIGNED)  08/09/2017    DTAP/TDAP/TD IMMUNIZATION (2 - Td or Tdap) 05/20/2023    COVID-19 Vaccine (4 - 2023-24 season)  "09/01/2023    MEDICARE ANNUAL WELLNESS VISIT  10/05/2023    ANNUAL REVIEW OF HM ORDERS  11/03/2023    FALL RISK ASSESSMENT  11/30/2024    LIPID  10/05/2027    COLORECTAL CANCER SCREENING  04/20/2031    PHQ-2 (once per calendar year)  Completed    INFLUENZA VACCINE  Completed    Pneumococcal Vaccine: 65+ Years  Completed    ZOSTER IMMUNIZATION  Completed    IPV IMMUNIZATION  Aged Out    HPV IMMUNIZATION  Aged Out    MENINGITIS IMMUNIZATION  Aged Out    RSV MONOCLONAL ANTIBODY  Aged Out     Review of Systems   Constitutional:  Negative for chills and fever.   HENT:  Negative for congestion, ear pain, hearing loss and sore throat.    Eyes:  Positive for visual disturbance. Negative for pain.   Respiratory:  Positive for shortness of breath. Negative for cough.    Cardiovascular:  Negative for chest pain, palpitations and peripheral edema.   Gastrointestinal:  Negative for abdominal pain, constipation, diarrhea, heartburn, hematochezia and nausea.   Genitourinary:  Positive for frequency and impotence. Negative for dysuria, genital sores, hematuria, penile discharge and urgency.   Musculoskeletal:  Negative for arthralgias, joint swelling and myalgias.   Skin:  Negative for rash.   Neurological:  Negative for dizziness, weakness, headaches and paresthesias.   Psychiatric/Behavioral:  Negative for mood changes. The patient is not nervous/anxious.      OBJECTIVE:     PHYSICAL EXAM:  General Appearance: In no acute distress  /86 (BP Location: Left arm, Patient Position: Sitting, Cuff Size: Adult Regular)   Pulse 76   Temp 98.1  F (36.7  C) (Tympanic)   Resp 16   Ht 1.778 m (5' 10\")   Wt 116.7 kg (257 lb 3.2 oz)   SpO2 99%   BMI 36.90 kg/m    EYES: Clear   NECK:  without cervical or axillary adenopathy  RESPIRATORY: Clear   CARDIOVASCULAR: S1, S2  ABDOMEN: soft, flat, and non-tender  EXTREMITIES: No joint swelling, mild bilateral edema, no inflammation, or ulceration  NEUROLOGIC: Speech is clear, gait is " "normal  PSYCHIATRIC: Oriented X 3,  thinking is clear     ASSESSMENT / PLAN:   Erik was seen today for wellness visit.    Diagnoses and all orders for this visit:    Need for hepatitis C screening test  -     Hepatitis C Screen Reflex to HCV RNA Quant and Genotype    Need for Tdap vaccination  Advised and referred to pharmacy    Need for vaccination against respiratory syncytial virus  Not interested, given wife's reaction to covid.     Encounter for Medicare annual wellness exam    Essential hypertension  Satisfactory control with current treatment - hydrochlorothiazide and lisinopril.   -     CBC with platelets  -     Comprehensive metabolic panel    Class 2 obesity due to excess calories without serious comorbidity with body mass index (BMI) of 36.0 to 36.9 in adult  We discussed the option of using semiglutide to treat his obesity.  He has some interest and will look into it for himself before trying, it.  I encouraged him to consider this treatment.     Chronic venous insufficiency  stable    Hyperlipidemia LDL goal <130  LDL levels have been modestly elevated - 140 last year.   -     Lipid panel reflex to direct LDL Fasting    JACOB on CPAP  This is going well.     Screening for prostate cancer  -     PSA, screen    Screening for colon cancer  Last colonoscopy 4/2021, was recommended for 1 year, he agrees to proceed.   -     Colonoscopy Screening  Referral    COUNSELING:  Healthy life diet and activity are discussed.      BMI:   Estimated body mass index is 36.9 kg/m  as calculated from the following:    Height as of this encounter: 1.778 m (5' 10\").    Weight as of this encounter: 116.7 kg (257 lb 3.2 oz).     He reports that he has never smoked. He has never used smokeless tobacco.    Appropriate preventive services were discussed with this patient, including applicable screening as appropriate for fall prevention, nutrition, physical activity, Tobacco-use cessation, weight loss and cognition.  " Checklist reviewing preventive services available has been given to the patient.    Reviewed patients plan of care and provided an AVS. The Basic Care Plan (routine screening as documented in Health Maintenance) for Jan meets the Care Plan requirement. This Care Plan has been established and reviewed with the Patient.    Rohan Dooley MD  [facility]    Identified Health Risks:  I have reviewed Opioid Use Disorder and Substance Use Disorder risk factors and made any needed referrals.   Obesity  Hypertension  Hyperlipidemia  JACOB on CPAP  Chronic venous insufficiency

## 2023-11-30 NOTE — LETTER
December 3, 2023      Erik Campa  [address] [facility] [address]        Dear ,    We are writing to inform you of your test results.    Your tests are all good.  The cholesterol levels - the LDL is the most important - are mildly elevated.  You could take medication but I don't feel that it is a strong recommendation. We can discuss this at our next visit.     Resulted Orders   Hepatitis C Screen Reflex to HCV RNA Quant and Genotype   Result Value Ref Range    Hepatitis C Antibody Nonreactive Nonreactive    Narrative    Assay performance characteristics have not been established for newborns, infants, and children.   CBC with platelets   Result Value Ref Range    WBC Count 7.5 4.0 - 11.0 10e3/uL    RBC Count 5.77 4.40 - 5.90 10e6/uL    Hemoglobin 16.2 13.3 - 17.7 g/dL    Hematocrit 50.6 40.0 - 53.0 %    MCV 88 78 - 100 fL    MCH 28.1 26.5 - 33.0 pg    MCHC 32.0 31.5 - 36.5 g/dL    RDW 14.0 10.0 - 15.0 %    Platelet Count 223 150 - 450 10e3/uL   Comprehensive metabolic panel   Result Value Ref Range    Sodium 139 135 - 145 mmol/L      Comment:      Reference intervals for this test were updated on 09/26/2023 to more accurately reflect our healthy population. There may be differences in the flagging of prior results with similar values performed with this method. Interpretation of those prior results can be made in the context of the updated reference intervals.     Potassium 4.4 3.4 - 5.3 mmol/L    Carbon Dioxide (CO2) 28 22 - 29 mmol/L    Anion Gap 8 7 - 15 mmol/L    Urea Nitrogen 15.5 8.0 - 23.0 mg/dL    Creatinine 0.98 0.67 - 1.17 mg/dL    GFR Estimate 82 >60 mL/min/1.73m2    Calcium 9.3 8.8 - 10.2 mg/dL    Chloride 103 98 - 107 mmol/L    Glucose 97 70 - 99 mg/dL    Alkaline Phosphatase 50 40 - 150 U/L      Comment:      Reference intervals for this test were updated on 11/14/2023 to more accurately reflect our healthy population. There may be differences in the flagging of prior results with  similar values performed with this method. Interpretation of those prior results can be made in the context of the updated reference intervals.    AST 21 0 - 45 U/L      Comment:      Reference intervals for this test were updated on 6/12/2023 to more accurately reflect our healthy population. There may be differences in the flagging of prior results with similar values performed with this method. Interpretation of those prior results can be made in the context of the updated reference intervals.    ALT 14 0 - 70 U/L      Comment:      Reference intervals for this test were updated on 6/12/2023 to more accurately reflect our healthy population. There may be differences in the flagging of prior results with similar values performed with this method. Interpretation of those prior results can be made in the context of the updated reference intervals.      Protein Total 7.2 6.4 - 8.3 g/dL    Albumin 4.3 3.5 - 5.2 g/dL    Bilirubin Total 0.9 <=1.2 mg/dL   Lipid panel reflex to direct LDL Fasting   Result Value Ref Range    Cholesterol 195 <200 mg/dL    Triglycerides 144 <150 mg/dL    Direct Measure HDL 35 (L) >=40 mg/dL    LDL Cholesterol Calculated 131 (H) <=100 mg/dL    Non HDL Cholesterol 160 (H) <130 mg/dL    Narrative    Cholesterol  Desirable:  <200 mg/dL    Triglycerides  Normal:  Less than 150 mg/dL  Borderline High:  150-199 mg/dL  High:  200-499 mg/dL  Very High:  Greater than or equal to 500 mg/dL    Direct Measure HDL  Female:  Greater than or equal to 50 mg/dL   Male:  Greater than or equal to 40 mg/dL    LDL Cholesterol  Desirable:  <100mg/dL  Above Desirable:  100-129 mg/dL   Borderline High:  130-159 mg/dL   High:  160-189 mg/dL   Very High:  >= 190 mg/dL    Non HDL Cholesterol  Desirable:  130 mg/dL  Above Desirable:  130-159 mg/dL  Borderline High:  160-189 mg/dL  High:  190-219 mg/dL  Very High:  Greater than or equal to 220 mg/dL   PSA, screen   Result Value Ref Range    Prostate Specific Antigen  Screen 0.65 0.00 - 6.50 ng/mL    Narrative    This result is obtained using the Roche Elecsys total PSA method on the silver e801 immunoassay analyzer. Results obtained with different assay methods or kits cannot be used interchangeably.       If you have any questions or concerns, please call the clinic at the number listed above.       Sincerely,      Rohan Dooley MD

## 2023-11-30 NOTE — PATIENT INSTRUCTIONS
Patient Education   Personalized Prevention Plan  You are due for the preventive services outlined below.  Your care team is available to assist you in scheduling these services.  If you have already completed any of these items, please share that information with your care team to update in your medical record.  Health Maintenance Due   Topic Date Due     Hepatitis C Screening  Never done     RSV VACCINE (Pregnancy & 60+) (1 - 1-dose 60+ series) Never done     AORTIC ANEURYSM SCREENING (SYSTEM ASSIGNED)  08/09/2017     Diptheria Tetanus Pertussis (DTAP/TDAP/TD) Vaccine (2 - Td or Tdap) 05/20/2023     COVID-19 Vaccine (4 - 2023-24 season) 09/01/2023     Annual Wellness Visit  10/05/2023     ANNUAL REVIEW OF HM ORDERS  11/03/2023     Hearing Loss: Care Instructions  Overview     Hearing loss is a sudden or slow decrease in how well you hear. It can range from slight to profound. Permanent hearing loss can occur with aging. It also can happen when you are exposed long-term to loud noise. Examples include listening to loud music, riding motorcycles, or being around other loud machines.  Hearing loss can affect your work and home life. It can make you feel lonely or depressed. You may feel that you have lost your independence. But hearing aids and other devices can help you hear better and feel connected to others.  Follow-up care is a key part of your treatment and safety. Be sure to make and go to all appointments, and call your doctor if you are having problems. It's also a good idea to know your test results and keep a list of the medicines you take.  How can you care for yourself at home?  Avoid loud noises whenever possible. This helps keep your hearing from getting worse.  Always wear hearing protection around loud noises.  Wear a hearing aid as directed.  A professional can help you pick a hearing aid that will work best for you.  You can also get hearing aids over the counter for mild to moderate hearing  "loss.  Have hearing tests as your doctor suggests. They can show whether your hearing has changed. Your hearing aid may need to be adjusted.  Use other devices as needed. These may include:  Telephone amplifiers and hearing aids that can connect to a television, stereo, radio, or microphone.  Devices that use lights or vibrations. These alert you to the doorbell, a ringing telephone, or a baby monitor.  Television closed-captioning. This shows the words at the bottom of the screen. Most new TVs can do this.  TTY (text telephone). This lets you type messages back and forth on the telephone instead of talking or listening. These devices are also called TDD. When messages are typed on the keyboard, they are sent over the phone line to a receiving TTY. The message is shown on a monitor.  Use text messaging, social media, and email if it is hard for you to communicate by telephone.  Try to learn a listening technique called speechreading. It is not lipreading. You pay attention to people's gestures, expressions, posture, and tone of voice. These clues can help you understand what a person is saying. Face the person you are talking to, and have them face you. Make sure the lighting is good. You need to see the other person's face clearly.  Think about counseling if you need help to adjust to your hearing loss.  When should you call for help?  Watch closely for changes in your health, and be sure to contact your doctor if:    You think your hearing is getting worse.     You have new symptoms, such as dizziness or nausea.   Where can you learn more?  Go to https://www.[employer].net/patiented  Enter R798 in the search box to learn more about \"Hearing Loss: Care Instructions.\"  Current as of: February 28, 2023               Content Version: 13.8    2950-8700 [employer].   Care instructions adapted under license by your healthcare professional. If you have questions about a medical condition or this instruction, " always ask your healthcare professional. [employer] disclaims any warranty or liability for your use of this information.

## 2023-12-01 LAB
ALBUMIN SERPL BCG-MCNC: 4.3 G/DL (ref 3.5–5.2)
ALP SERPL-CCNC: 50 U/L (ref 40–150)
ALT SERPL W P-5'-P-CCNC: 14 U/L (ref 0–70)
ANION GAP SERPL CALCULATED.3IONS-SCNC: 8 MMOL/L (ref 7–15)
AST SERPL W P-5'-P-CCNC: 21 U/L (ref 0–45)
BILIRUB SERPL-MCNC: 0.9 MG/DL
BUN SERPL-MCNC: 15.5 MG/DL (ref 8–23)
CALCIUM SERPL-MCNC: 9.3 MG/DL (ref 8.8–10.2)
CHLORIDE SERPL-SCNC: 103 MMOL/L (ref 98–107)
CHOLEST SERPL-MCNC: 195 MG/DL
CREAT SERPL-MCNC: 0.98 MG/DL (ref 0.67–1.17)
DEPRECATED HCO3 PLAS-SCNC: 28 MMOL/L (ref 22–29)
EGFRCR SERPLBLD CKD-EPI 2021: 82 ML/MIN/1.73M2
GLUCOSE SERPL-MCNC: 97 MG/DL (ref 70–99)
HCV AB SERPL QL IA: NONREACTIVE
HDLC SERPL-MCNC: 35 MG/DL
LDLC SERPL CALC-MCNC: 131 MG/DL
NONHDLC SERPL-MCNC: 160 MG/DL
POTASSIUM SERPL-SCNC: 4.4 MMOL/L (ref 3.4–5.3)
PROT SERPL-MCNC: 7.2 G/DL (ref 6.4–8.3)
PSA SERPL DL<=0.01 NG/ML-MCNC: 0.65 NG/ML (ref 0–6.5)
SODIUM SERPL-SCNC: 139 MMOL/L (ref 135–145)
TRIGL SERPL-MCNC: 144 MG/DL

## 2024-03-10 DIAGNOSIS — I10 ESSENTIAL HYPERTENSION: ICD-10-CM

## 2024-03-11 RX ORDER — HYDROCHLOROTHIAZIDE 12.5 MG/1
TABLET ORAL
Qty: 90 TABLET | Refills: 1 | Status: SHIPPED | OUTPATIENT
Start: 2024-03-11 | End: 2024-09-04

## 2024-08-07 DIAGNOSIS — G47.33 OSA ON CPAP: Primary | ICD-10-CM

## 2024-09-03 DIAGNOSIS — I10 ESSENTIAL HYPERTENSION: ICD-10-CM

## 2024-09-04 ENCOUNTER — MYC REFILL (OUTPATIENT)
Dept: INTERNAL MEDICINE | Facility: CLINIC | Age: 74
End: 2024-09-04
Payer: MEDICARE

## 2024-09-04 DIAGNOSIS — I10 ESSENTIAL HYPERTENSION: ICD-10-CM

## 2024-09-04 RX ORDER — LISINOPRIL 30 MG/1
30 TABLET ORAL DAILY
Qty: 90 TABLET | Refills: 3 | OUTPATIENT
Start: 2024-09-04

## 2024-09-04 RX ORDER — LISINOPRIL 30 MG/1
30 TABLET ORAL DAILY
Qty: 90 TABLET | Refills: 3 | Status: SHIPPED | OUTPATIENT
Start: 2024-09-04

## 2024-09-04 RX ORDER — HYDROCHLOROTHIAZIDE 12.5 MG/1
TABLET ORAL
Qty: 90 TABLET | Refills: 1 | Status: SHIPPED | OUTPATIENT
Start: 2024-09-04

## 2024-10-31 ENCOUNTER — PATIENT OUTREACH (OUTPATIENT)
Dept: CARE COORDINATION | Facility: CLINIC | Age: 74
End: 2024-10-31
Payer: MEDICARE

## 2024-11-14 ENCOUNTER — PATIENT OUTREACH (OUTPATIENT)
Dept: CARE COORDINATION | Facility: CLINIC | Age: 74
End: 2024-11-14
Payer: MEDICARE

## 2025-01-04 ENCOUNTER — HEALTH MAINTENANCE LETTER (OUTPATIENT)
Age: 75
End: 2025-01-04

## 2025-03-01 DIAGNOSIS — I10 ESSENTIAL HYPERTENSION: ICD-10-CM

## 2025-03-04 RX ORDER — HYDROCHLOROTHIAZIDE 12.5 MG/1
TABLET ORAL
Qty: 90 TABLET | Refills: 3 | Status: SHIPPED | OUTPATIENT
Start: 2025-03-04

## 2025-05-29 ENCOUNTER — PATIENT OUTREACH (OUTPATIENT)
Dept: CARE COORDINATION | Facility: CLINIC | Age: 75
End: 2025-05-29
Payer: MEDICARE

## 2025-06-20 NOTE — TELEPHONE ENCOUNTER
Home Care received a Referral for Physical Therapy, Occupational Therapy, and Home Health Aide. We have processed the referral for a Start of Care on 6/23/25.     If you have any questions or concerns, please feel free to contact us at [phone]. Follow the prompts, enter your five digit zip code, and you will be directed to your care team on [facility].      "Routing refill request to provider for review/approval because:  bp out of range    Last Written Prescription Date:  10/3/22  Last Fill Quantity: 30,  # refills: 0   Last office visit provider:  10/5/22     Requested Prescriptions   Pending Prescriptions Disp Refills     hydrochlorothiazide (HYDRODIURIL) 12.5 MG tablet 30 tablet 0     Sig: Take 1 tablet (12.5 mg) by mouth daily       Diuretics (Including Combos) Protocol Failed - 10/28/2022  3:04 PM        Failed - Blood pressure under 140/90 in past 12 months     BP Readings from Last 3 Encounters:   10/05/22 (!) 152/91   09/08/22 (!) 145/82   03/17/21 136/84                 Passed - Recent (12 mo) or future (30 days) visit within the authorizing provider's specialty     Patient has had an office visit with the authorizing provider or a provider within the authorizing providers department within the previous 12 mos or has a future within next 30 days. See \"Patient Info\" tab in inbasket, or \"Choose Columns\" in Meds & Orders section of the refill encounter.              Passed - Medication is active on med list        Passed - Patient is age 18 or older        Passed - Normal serum creatinine on file in past 12 months     Recent Labs   Lab Test 09/08/22  1042   CR 0.93              Passed - Normal serum potassium on file in past 12 months     Recent Labs   Lab Test 09/08/22  1042   POTASSIUM 5.0                    Passed - Normal serum sodium on file in past 12 months     Recent Labs   Lab Test 09/08/22  1042                      Angela Olmstead RN 10/29/22 9:14 PM  "

## 2025-08-05 SDOH — HEALTH STABILITY: PHYSICAL HEALTH: ON AVERAGE, HOW MANY DAYS PER WEEK DO YOU ENGAGE IN MODERATE TO STRENUOUS EXERCISE (LIKE A BRISK WALK)?: 3 DAYS

## 2025-08-05 SDOH — HEALTH STABILITY: PHYSICAL HEALTH: ON AVERAGE, HOW MANY MINUTES DO YOU ENGAGE IN EXERCISE AT THIS LEVEL?: 20 MIN

## 2025-08-05 ASSESSMENT — SOCIAL DETERMINANTS OF HEALTH (SDOH): HOW OFTEN DO YOU GET TOGETHER WITH FRIENDS OR RELATIVES?: TWICE A WEEK

## 2025-08-07 ENCOUNTER — OFFICE VISIT (OUTPATIENT)
Dept: INTERNAL MEDICINE | Facility: CLINIC | Age: 75
End: 2025-08-07
Payer: MEDICARE

## 2025-08-07 VITALS
HEART RATE: 76 BPM | WEIGHT: 257 LBS | BODY MASS INDEX: 35.98 KG/M2 | TEMPERATURE: 97.4 F | RESPIRATION RATE: 14 BRPM | HEIGHT: 71 IN | OXYGEN SATURATION: 98 % | DIASTOLIC BLOOD PRESSURE: 82 MMHG | SYSTOLIC BLOOD PRESSURE: 136 MMHG

## 2025-08-07 DIAGNOSIS — I87.2 CHRONIC VENOUS INSUFFICIENCY: ICD-10-CM

## 2025-08-07 DIAGNOSIS — G47.33 OSA ON CPAP: ICD-10-CM

## 2025-08-07 DIAGNOSIS — M21.41 PES PLANUS OF BOTH FEET: ICD-10-CM

## 2025-08-07 DIAGNOSIS — E78.5 HYPERLIPIDEMIA LDL GOAL <130: ICD-10-CM

## 2025-08-07 DIAGNOSIS — E66.812 CLASS 2 OBESITY DUE TO EXCESS CALORIES WITHOUT SERIOUS COMORBIDITY WITH BODY MASS INDEX (BMI) OF 36.0 TO 36.9 IN ADULT: ICD-10-CM

## 2025-08-07 DIAGNOSIS — M48.062 SPINAL STENOSIS OF LUMBAR REGION WITH NEUROGENIC CLAUDICATION: ICD-10-CM

## 2025-08-07 DIAGNOSIS — H61.23 BILATERAL IMPACTED CERUMEN: ICD-10-CM

## 2025-08-07 DIAGNOSIS — I49.9 IRREGULAR HEART RHYTHM: ICD-10-CM

## 2025-08-07 DIAGNOSIS — Z23 NEED FOR VACCINATION: ICD-10-CM

## 2025-08-07 DIAGNOSIS — I48.20 CHRONIC ATRIAL FIBRILLATION (H): ICD-10-CM

## 2025-08-07 DIAGNOSIS — E05.90 HYPERTHYROIDISM: ICD-10-CM

## 2025-08-07 DIAGNOSIS — Z86.0100 HISTORY OF COLONIC POLYPS: ICD-10-CM

## 2025-08-07 DIAGNOSIS — E66.09 CLASS 2 OBESITY DUE TO EXCESS CALORIES WITHOUT SERIOUS COMORBIDITY WITH BODY MASS INDEX (BMI) OF 36.0 TO 36.9 IN ADULT: ICD-10-CM

## 2025-08-07 DIAGNOSIS — E66.812 CLASS 2 SEVERE OBESITY WITH BODY MASS INDEX (BMI) OF 35 TO 39.9 WITH SERIOUS COMORBIDITY (H): ICD-10-CM

## 2025-08-07 DIAGNOSIS — Z13.1 SCREENING FOR DIABETES MELLITUS: ICD-10-CM

## 2025-08-07 DIAGNOSIS — M21.42 PES PLANUS OF BOTH FEET: ICD-10-CM

## 2025-08-07 DIAGNOSIS — I10 ESSENTIAL HYPERTENSION: Primary | ICD-10-CM

## 2025-08-07 DIAGNOSIS — E66.01 CLASS 2 SEVERE OBESITY WITH BODY MASS INDEX (BMI) OF 35 TO 39.9 WITH SERIOUS COMORBIDITY (H): ICD-10-CM

## 2025-08-07 PROBLEM — R06.83 SNORING: Status: RESOLVED | Noted: 2021-03-17 | Resolved: 2025-08-07

## 2025-08-07 LAB
ATRIAL RATE - MUSE: 129 BPM
DIASTOLIC BLOOD PRESSURE - MUSE: NORMAL MMHG
ERYTHROCYTE [DISTWIDTH] IN BLOOD BY AUTOMATED COUNT: 14.3 % (ref 10–15)
EST. AVERAGE GLUCOSE BLD GHB EST-MCNC: 114 MG/DL
HBA1C MFR BLD: 5.6 % (ref 0–5.6)
HCT VFR BLD AUTO: 49.5 % (ref 40–53)
HGB BLD-MCNC: 16.4 G/DL (ref 13.3–17.7)
INTERPRETATION ECG - MUSE: NORMAL
MCH RBC QN AUTO: 28.7 PG (ref 26.5–33)
MCHC RBC AUTO-ENTMCNC: 33.1 G/DL (ref 31.5–36.5)
MCV RBC AUTO: 87 FL (ref 78–100)
P AXIS - MUSE: NORMAL DEGREES
PLATELET # BLD AUTO: 230 10E3/UL (ref 150–450)
PR INTERVAL - MUSE: NORMAL MS
QRS DURATION - MUSE: 102 MS
QT - MUSE: 322 MS
QTC - MUSE: 449 MS
R AXIS - MUSE: 23 DEGREES
RBC # BLD AUTO: 5.71 10E6/UL (ref 4.4–5.9)
SYSTOLIC BLOOD PRESSURE - MUSE: NORMAL MMHG
T AXIS - MUSE: 18 DEGREES
VENTRICULAR RATE- MUSE: 117 BPM
WBC # BLD AUTO: 9.4 10E3/UL (ref 4–11)

## 2025-08-07 RX ORDER — METOPROLOL SUCCINATE 25 MG/1
25 TABLET, EXTENDED RELEASE ORAL DAILY
Qty: 90 TABLET | Refills: 3 | Status: SHIPPED | OUTPATIENT
Start: 2025-08-07

## 2025-08-11 ENCOUNTER — PATIENT OUTREACH (OUTPATIENT)
Dept: CARE COORDINATION | Facility: CLINIC | Age: 75
End: 2025-08-11
Payer: MEDICARE

## 2025-08-11 ENCOUNTER — RESULTS FOLLOW-UP (OUTPATIENT)
Dept: INTERNAL MEDICINE | Facility: CLINIC | Age: 75
End: 2025-08-11
Payer: MEDICARE

## 2025-08-16 DIAGNOSIS — I10 ESSENTIAL HYPERTENSION: ICD-10-CM

## 2025-08-18 RX ORDER — LISINOPRIL 30 MG/1
30 TABLET ORAL DAILY
Qty: 90 TABLET | Refills: 2 | Status: SHIPPED | OUTPATIENT
Start: 2025-08-18